# Patient Record
Sex: MALE | Employment: FULL TIME | URBAN - METROPOLITAN AREA
[De-identification: names, ages, dates, MRNs, and addresses within clinical notes are randomized per-mention and may not be internally consistent; named-entity substitution may affect disease eponyms.]

---

## 2018-07-23 PROBLEM — I34.1 MITRAL VALVE PROLAPSE: Status: ACTIVE | Noted: 2018-07-23

## 2018-07-23 PROBLEM — Z80.42 FAMILY HISTORY OF PROSTATE CANCER IN FATHER: Status: ACTIVE | Noted: 2018-07-23

## 2019-08-12 ENCOUNTER — OFFICE VISIT (OUTPATIENT)
Dept: ORTHOPEDICS | Facility: CLINIC | Age: 39
End: 2019-08-12
Payer: COMMERCIAL

## 2019-08-12 VITALS
WEIGHT: 200 LBS | HEIGHT: 71 IN | HEART RATE: 60 BPM | SYSTOLIC BLOOD PRESSURE: 122 MMHG | DIASTOLIC BLOOD PRESSURE: 70 MMHG | BODY MASS INDEX: 28 KG/M2

## 2019-08-12 DIAGNOSIS — S83.242A OTHER TEAR OF MEDIAL MENISCUS OF LEFT KNEE AS CURRENT INJURY, INITIAL ENCOUNTER: ICD-10-CM

## 2019-08-12 DIAGNOSIS — M19.171 POST-TRAUMATIC ARTHRITIS OF ANKLE, RIGHT: Primary | ICD-10-CM

## 2019-08-12 DIAGNOSIS — M25.562 ACUTE PAIN OF LEFT KNEE: ICD-10-CM

## 2019-08-12 PROCEDURE — 99204 PR OFFICE/OUTPT VISIT, NEW, LEVL IV, 45-59 MIN: ICD-10-PCS | Mod: S$GLB,,, | Performed by: ORTHOPAEDIC SURGERY

## 2019-08-12 PROCEDURE — 99204 OFFICE O/P NEW MOD 45 MIN: CPT | Mod: S$GLB,,, | Performed by: ORTHOPAEDIC SURGERY

## 2019-08-12 PROCEDURE — 3008F BODY MASS INDEX DOCD: CPT | Mod: S$GLB,,, | Performed by: ORTHOPAEDIC SURGERY

## 2019-08-12 PROCEDURE — 3008F PR BODY MASS INDEX (BMI) DOCUMENTED: ICD-10-PCS | Mod: S$GLB,,, | Performed by: ORTHOPAEDIC SURGERY

## 2019-08-12 NOTE — PROGRESS NOTES
[facility]    Subjective:     Chief Complaint:   Chief Complaint   Patient presents with    Right Ankle - Pain     Right ankle pain x a while. States that he had surgery a while back. His pain gets worse with activities.     Left Knee - Pain     Left knee pain x 1 month. States that his knee is not really pain. He was playing basketball about a month ago and states that he woke up the next day and his knee felt tight and swells. Does have popping in the knee.        Past Medical History:   Diagnosis Date    Asthma     Hyperlipidemia        Past Surgical History:   Procedure Laterality Date    ANKLE SURGERY         No current outpatient medications on file.     No current facility-administered medications for this visit.        Review of patient's allergies indicates:  No Known Allergies    Family History   Problem Relation Age of Onset    Hyperlipidemia Father        Social History     Socioeconomic History    Marital status: Single     Spouse name: Not on file    Number of children: Not on file    Years of education: Not on file    Highest education level: Not on file   Occupational History    Not on file   Social Needs    Financial resource strain: Not on file    Food insecurity:     Worry: Not on file     Inability: Not on file    Transportation needs:     Medical: Not on file     Non-medical: Not on file   Tobacco Use    Smoking status: Never Smoker   Substance and Sexual Activity    Alcohol use: Not on file    Drug use: Not on file    Sexual activity: Not on file   Lifestyle    Physical activity:     Days per week: Not on file     Minutes per session: Not on file    Stress: Not on file   Relationships    Social connections:     Talks on phone: Not on file     Gets together: Not on file     Attends Jain service: Not on file     Active member of club or organization: Not on file     Attends meetings of clubs or organizations: Not on file     Relationship status: Not on file    Other Topics Concern    Not on file   Social History Narrative    Not on file       History of present illness: 38-year-old male with 2 issues first is his right ankle. He scat right ankle issues for a long time he's had arthroscopy done he still tried to play basketball but it really bothers him when he plays ball hikes or anything more aggressive his pain swelling right ankle. No new injury. In his left knee is a recent problem over the last month or so he said some pain in the left knee he skis back at any activities but the knees still bothers him every day. Does not have a lot of swelling. Doesn't remember any specific injury. He is a very active individual.  Review of Systems:    Constitution: Negative for chills, fever, and sweats.  Negative for unexplained weight loss.    HENT:  Negative for headaches and blurry vision.    Cardiovascular:Negative for chest pain or irregular heart beat. Negative for hypertension.    Respiratory:  Negative for cough and shortness of breath.    Gastrointestinal: Negative for abdominal pain, heartburn, melena, nausea, and vomitting.    Genitourinary:  Negative bladder incontinence and dysuria.    Musculoskeletal:  See HPI for details.     Neurological: Negative for numbness.    Psychiatric/Behavioral: Negative for depression.  The patient is not nervous/anxious.      Endocrine: Negative for polyuria    Hematologic/Lymphatic: Negative for bleeding problem.  Does not bruise/bleed easily.    Skin: Negative for poor would healing and rash    Objective:      Physical Examination:    Vital Signs:    Vitals:    08/12/19 0843   BP: 122/70   Pulse: 60       Body mass index is 27.89 kg/m².    This a well-developed, well nourished patient in no acute distress.  They are alert and oriented and cooperative to examination.        Hanna look at the right ankle he scat normal alignment. Ankles slightly bigger than opposite side but does not have a major fusion. He has nearly normal  dorsiflexion plantarflexion has no pain with inversion eversion. He has slight tenderness to lateral side the ankle joint compared to the medial side. The left knee mild varus. No effusion. May have a little bit of quad atrophy. Little crepitation of the patella but also some tenderness along medial joint line no effusion good range of motion Vijaya not impressive and no limp  Pertinent New Results:    XRAY Report / Interpretation:   AP lateral mortise of the right ankle shows he clearly has arthritis of the ankle joint. There is sclerosis and narrowing more than 50% lateral side slightly worse than medial side. There is some subchondral cyst formation on the distal tibia. There is no hardware. There is no alignment issues. The lateral view show some spurring anterior posterior on the distal tibia. And we have the left knee AP lateral sunrise left knee. Shows mild varus alignment symmetrical both knees. No significant degenerative changes or acute findings in the left knee.Electronically Signed By Fortunato Hyman JR, MD    Assessment/Plan:      So my impression right ankles clearly traumatic arthritis. He's RA had a scope. Fortunately still has good range of motion. I don't think another scope can help him at this point his motion fortunately is still good. The issue is trying to limit some of his activities such as basketball which of course is going have a problem hiking yes that's gonorrhea problem he has ankle braces he has anti-inflammatories he has ice he has CBD oil and cream which seemed to help with flareups. Have any easy way around the right ankle. I do not think arthroscopy would be of any use. And he is not ready for any more aggressive treatment like effusion. The left knee were looking for medial meniscus tear. He's been quieted for about a month he still has pain some of the medial compartment I'm looking for meniscal tear left knee that would need fixing. I would do an MRI left knee      This note  was created using Dragon voice recognition software that occasionally misinterpreted phrases or words.

## 2019-08-17 ENCOUNTER — HOSPITAL ENCOUNTER (INPATIENT)
Facility: HOSPITAL | Age: 39
LOS: 3 days | Discharge: HOME | DRG: 389 | End: 2019-08-20
Attending: EMERGENCY MEDICINE | Admitting: INTERNAL MEDICINE
Payer: COMMERCIAL

## 2019-08-17 DIAGNOSIS — R10.9 ABDOMINAL PAIN: ICD-10-CM

## 2019-08-17 DIAGNOSIS — K56.609 SMALL BOWEL OBSTRUCTION: Primary | ICD-10-CM

## 2019-08-17 DIAGNOSIS — Z46.59 ENCOUNTER FOR NASOGASTRIC (NG) TUBE PLACEMENT: ICD-10-CM

## 2019-08-17 LAB
ALBUMIN SERPL BCP-MCNC: 4.4 G/DL (ref 3.5–5.2)
ALP SERPL-CCNC: 60 U/L (ref 55–135)
ALT SERPL W/O P-5'-P-CCNC: 68 U/L (ref 10–44)
AMPHET+METHAMPHET UR QL: NEGATIVE
ANION GAP SERPL CALC-SCNC: 13 MMOL/L (ref 8–16)
AST SERPL-CCNC: 60 U/L (ref 10–40)
BACTERIA #/AREA URNS HPF: NEGATIVE /HPF
BARBITURATES UR QL SCN>200 NG/ML: ABNORMAL
BASOPHILS # BLD AUTO: 0.02 K/UL (ref 0–0.2)
BASOPHILS NFR BLD: 0.5 % (ref 0–1.9)
BENZODIAZ UR QL SCN>200 NG/ML: NEGATIVE
BILIRUB SERPL-MCNC: 1.1 MG/DL (ref 0.1–1)
BILIRUB UR QL STRIP: ABNORMAL
BUN SERPL-MCNC: 21 MG/DL (ref 6–20)
BZE UR QL SCN: NEGATIVE
CALCIUM SERPL-MCNC: 9.1 MG/DL (ref 8.7–10.5)
CANNABINOIDS UR QL SCN: NEGATIVE
CHLORIDE SERPL-SCNC: 93 MMOL/L (ref 95–110)
CK SERPL-CCNC: 155 U/L (ref 20–200)
CLARITY UR: ABNORMAL
CO2 SERPL-SCNC: 28 MMOL/L (ref 23–29)
COLOR UR: YELLOW
CREAT SERPL-MCNC: 2.1 MG/DL (ref 0.5–1.4)
CREAT UR-MCNC: 840 MG/DL (ref 23–375)
DIFFERENTIAL METHOD: ABNORMAL
EOSINOPHIL # BLD AUTO: 0 K/UL (ref 0–0.5)
EOSINOPHIL NFR BLD: 0.5 % (ref 0–8)
ERYTHROCYTE [DISTWIDTH] IN BLOOD BY AUTOMATED COUNT: 12.3 % (ref 11.5–14.5)
EST. GFR  (AFRICAN AMERICAN): 44.2 ML/MIN/1.73 M^2
EST. GFR  (NON AFRICAN AMERICAN): 38.2 ML/MIN/1.73 M^2
GLUCOSE SERPL-MCNC: 135 MG/DL (ref 70–110)
GLUCOSE UR QL STRIP: NEGATIVE
HCT VFR BLD AUTO: 47.8 % (ref 40–54)
HGB BLD-MCNC: 15.8 G/DL (ref 14–18)
HGB UR QL STRIP: NEGATIVE
HYALINE CASTS #/AREA URNS LPF: 85 /LPF
IMM GRANULOCYTES # BLD AUTO: 0.02 K/UL (ref 0–0.04)
IMM GRANULOCYTES NFR BLD AUTO: 0.5 % (ref 0–0.5)
KETONES UR QL STRIP: ABNORMAL
LDH SERPL L TO P-CCNC: 1.02 MMOL/L (ref 0.5–2.2)
LEUKOCYTE ESTERASE UR QL STRIP: ABNORMAL
LIPASE SERPL-CCNC: 35 U/L (ref 4–60)
LYMPHOCYTES # BLD AUTO: 0.8 K/UL (ref 1–4.8)
LYMPHOCYTES NFR BLD: 17.5 % (ref 18–48)
MCH RBC QN AUTO: 27.5 PG (ref 27–31)
MCHC RBC AUTO-ENTMCNC: 33.1 G/DL (ref 32–36)
MCV RBC AUTO: 83 FL (ref 82–98)
MICROSCOPIC COMMENT: ABNORMAL
MONOCYTES # BLD AUTO: 0.7 K/UL (ref 0.3–1)
MONOCYTES NFR BLD: 16.3 % (ref 4–15)
NEUTROPHILS # BLD AUTO: 2.9 K/UL (ref 1.8–7.7)
NEUTROPHILS NFR BLD: 64.7 % (ref 38–73)
NITRITE UR QL STRIP: NEGATIVE
NRBC BLD-RTO: 0 /100 WBC
OPIATES UR QL SCN: NEGATIVE
PCP UR QL SCN>25 NG/ML: NEGATIVE
PH UR STRIP: 6 [PH] (ref 5–8)
PLATELET # BLD AUTO: 222 K/UL (ref 150–350)
PMV BLD AUTO: 10.6 FL (ref 9.2–12.9)
POTASSIUM SERPL-SCNC: 3.8 MMOL/L (ref 3.5–5.1)
PROT SERPL-MCNC: 7.7 G/DL (ref 6–8.4)
PROT UR QL STRIP: ABNORMAL
RBC # BLD AUTO: 5.75 M/UL (ref 4.6–6.2)
RBC #/AREA URNS HPF: 11 /HPF (ref 0–4)
SAMPLE: NORMAL
SODIUM SERPL-SCNC: 134 MMOL/L (ref 136–145)
SP GR UR STRIP: 1.03 (ref 1–1.03)
SQUAMOUS #/AREA URNS HPF: 9 /HPF
TOXICOLOGY INFORMATION: ABNORMAL
URN SPEC COLLECT METH UR: ABNORMAL
UROBILINOGEN UR STRIP-ACNC: ABNORMAL EU/DL
WBC # BLD AUTO: 4.41 K/UL (ref 3.9–12.7)
WBC #/AREA URNS HPF: 10 /HPF (ref 0–5)

## 2019-08-17 PROCEDURE — 80307 DRUG TEST PRSMV CHEM ANLYZR: CPT

## 2019-08-17 PROCEDURE — 96375 TX/PRO/DX INJ NEW DRUG ADDON: CPT

## 2019-08-17 PROCEDURE — 93005 ELECTROCARDIOGRAM TRACING: CPT

## 2019-08-17 PROCEDURE — 12000002 HC ACUTE/MED SURGE SEMI-PRIVATE ROOM

## 2019-08-17 PROCEDURE — 83690 ASSAY OF LIPASE: CPT

## 2019-08-17 PROCEDURE — 36415 COLL VENOUS BLD VENIPUNCTURE: CPT

## 2019-08-17 PROCEDURE — 99285 EMERGENCY DEPT VISIT HI MDM: CPT | Mod: 25

## 2019-08-17 PROCEDURE — 43752 NASAL/OROGASTRIC W/TUBE PLMT: CPT

## 2019-08-17 PROCEDURE — 63600175 PHARM REV CODE 636 W HCPCS: Performed by: EMERGENCY MEDICINE

## 2019-08-17 PROCEDURE — 96367 TX/PROPH/DG ADDL SEQ IV INF: CPT

## 2019-08-17 PROCEDURE — 80053 COMPREHEN METABOLIC PANEL: CPT

## 2019-08-17 PROCEDURE — 96365 THER/PROPH/DIAG IV INF INIT: CPT

## 2019-08-17 PROCEDURE — 81001 URINALYSIS AUTO W/SCOPE: CPT

## 2019-08-17 PROCEDURE — 83605 ASSAY OF LACTIC ACID: CPT

## 2019-08-17 PROCEDURE — 82550 ASSAY OF CK (CPK): CPT

## 2019-08-17 PROCEDURE — 85025 COMPLETE CBC W/AUTO DIFF WBC: CPT

## 2019-08-17 PROCEDURE — 96361 HYDRATE IV INFUSION ADD-ON: CPT

## 2019-08-17 RX ORDER — ONDANSETRON 2 MG/ML
4 INJECTION INTRAMUSCULAR; INTRAVENOUS
Status: COMPLETED | OUTPATIENT
Start: 2019-08-17 | End: 2019-08-17

## 2019-08-17 RX ORDER — ACETAMINOPHEN 10 MG/ML
1000 INJECTION, SOLUTION INTRAVENOUS EVERY 8 HOURS
Status: COMPLETED | OUTPATIENT
Start: 2019-08-17 | End: 2019-08-18

## 2019-08-17 RX ADMIN — ONDANSETRON 4 MG: 2 INJECTION INTRAMUSCULAR; INTRAVENOUS at 09:08

## 2019-08-17 RX ADMIN — SODIUM CHLORIDE, SODIUM LACTATE, POTASSIUM CHLORIDE, AND CALCIUM CHLORIDE 1000 ML: .6; .31; .03; .02 INJECTION, SOLUTION INTRAVENOUS at 09:08

## 2019-08-17 RX ADMIN — PIPERACILLIN SODIUM AND TAZOBACTAM SODIUM 3.38 G: 3; .375 INJECTION, POWDER, LYOPHILIZED, FOR SOLUTION INTRAVENOUS at 11:08

## 2019-08-17 RX ADMIN — SODIUM CHLORIDE, SODIUM LACTATE, POTASSIUM CHLORIDE, AND CALCIUM CHLORIDE 1000 ML: .6; .31; .03; .02 INJECTION, SOLUTION INTRAVENOUS at 11:08

## 2019-08-17 RX ADMIN — ACETAMINOPHEN 1000 MG: 10 INJECTION, SOLUTION INTRAVENOUS at 09:08

## 2019-08-18 PROBLEM — R74.01 TRANSAMINITIS: Status: ACTIVE | Noted: 2019-08-18

## 2019-08-18 PROBLEM — R10.9 ABDOMINAL PAIN: Status: ACTIVE | Noted: 2019-08-18

## 2019-08-18 PROBLEM — K56.609 SMALL BOWEL OBSTRUCTION: Status: ACTIVE | Noted: 2019-08-18

## 2019-08-18 PROBLEM — N17.9 AKI (ACUTE KIDNEY INJURY): Status: ACTIVE | Noted: 2019-08-18

## 2019-08-18 LAB
BASOPHILS # BLD AUTO: 0.02 K/UL (ref 0–0.2)
BASOPHILS NFR BLD: 0.6 % (ref 0–1.9)
DIFFERENTIAL METHOD: ABNORMAL
EOSINOPHIL # BLD AUTO: 0 K/UL (ref 0–0.5)
EOSINOPHIL NFR BLD: 0.3 % (ref 0–8)
ERYTHROCYTE [DISTWIDTH] IN BLOOD BY AUTOMATED COUNT: 12.4 % (ref 11.5–14.5)
HCT VFR BLD AUTO: 45.6 % (ref 40–54)
HGB BLD-MCNC: 15 G/DL (ref 14–18)
IMM GRANULOCYTES # BLD AUTO: 0.01 K/UL (ref 0–0.04)
IMM GRANULOCYTES NFR BLD AUTO: 0.3 % (ref 0–0.5)
LYMPHOCYTES # BLD AUTO: 0.7 K/UL (ref 1–4.8)
LYMPHOCYTES NFR BLD: 20.4 % (ref 18–48)
MCH RBC QN AUTO: 27.5 PG (ref 27–31)
MCHC RBC AUTO-ENTMCNC: 32.9 G/DL (ref 32–36)
MCV RBC AUTO: 84 FL (ref 82–98)
MONOCYTES # BLD AUTO: 0.7 K/UL (ref 0.3–1)
MONOCYTES NFR BLD: 21.6 % (ref 4–15)
NEUTROPHILS # BLD AUTO: 2 K/UL (ref 1.8–7.7)
NEUTROPHILS NFR BLD: 56.8 % (ref 38–73)
NRBC BLD-RTO: 0 /100 WBC
PLATELET # BLD AUTO: 212 K/UL (ref 150–350)
PMV BLD AUTO: 10.5 FL (ref 9.2–12.9)
RBC # BLD AUTO: 5.46 M/UL (ref 4.6–6.2)
WBC # BLD AUTO: 3.43 K/UL (ref 3.9–12.7)

## 2019-08-18 PROCEDURE — 25000003 PHARM REV CODE 250: Performed by: EMERGENCY MEDICINE

## 2019-08-18 PROCEDURE — 63600175 PHARM REV CODE 636 W HCPCS: Performed by: EMERGENCY MEDICINE

## 2019-08-18 PROCEDURE — 25000003 PHARM REV CODE 250: Performed by: INTERNAL MEDICINE

## 2019-08-18 PROCEDURE — 94761 N-INVAS EAR/PLS OXIMETRY MLT: CPT

## 2019-08-18 PROCEDURE — 96375 TX/PRO/DX INJ NEW DRUG ADDON: CPT

## 2019-08-18 PROCEDURE — 12000002 HC ACUTE/MED SURGE SEMI-PRIVATE ROOM

## 2019-08-18 PROCEDURE — 85025 COMPLETE CBC W/AUTO DIFF WBC: CPT

## 2019-08-18 PROCEDURE — 36415 COLL VENOUS BLD VENIPUNCTURE: CPT

## 2019-08-18 PROCEDURE — 63600175 PHARM REV CODE 636 W HCPCS: Performed by: NURSE PRACTITIONER

## 2019-08-18 PROCEDURE — 96376 TX/PRO/DX INJ SAME DRUG ADON: CPT

## 2019-08-18 RX ORDER — HYDROMORPHONE HYDROCHLORIDE 1 MG/ML
0.5 INJECTION, SOLUTION INTRAMUSCULAR; INTRAVENOUS; SUBCUTANEOUS
Status: COMPLETED | OUTPATIENT
Start: 2019-08-18 | End: 2019-08-18

## 2019-08-18 RX ORDER — LANOLIN ALCOHOL/MO/W.PET/CERES
800 CREAM (GRAM) TOPICAL
Status: DISCONTINUED | OUTPATIENT
Start: 2019-08-18 | End: 2019-08-18

## 2019-08-18 RX ORDER — MORPHINE SULFATE 2 MG/ML
2 INJECTION, SOLUTION INTRAMUSCULAR; INTRAVENOUS EVERY 4 HOURS PRN
Status: DISCONTINUED | OUTPATIENT
Start: 2019-08-18 | End: 2019-08-20 | Stop reason: HOSPADM

## 2019-08-18 RX ORDER — ONDANSETRON 2 MG/ML
4 INJECTION INTRAMUSCULAR; INTRAVENOUS
Status: COMPLETED | OUTPATIENT
Start: 2019-08-18 | End: 2019-08-18

## 2019-08-18 RX ORDER — SODIUM CHLORIDE 0.9 % (FLUSH) 0.9 %
3 SYRINGE (ML) INJECTION
Status: DISCONTINUED | OUTPATIENT
Start: 2019-08-18 | End: 2019-08-20 | Stop reason: HOSPADM

## 2019-08-18 RX ORDER — MORPHINE SULFATE 4 MG/ML
4 INJECTION, SOLUTION INTRAMUSCULAR; INTRAVENOUS EVERY 4 HOURS PRN
Status: DISCONTINUED | OUTPATIENT
Start: 2019-08-18 | End: 2019-08-20 | Stop reason: HOSPADM

## 2019-08-18 RX ORDER — SODIUM CHLORIDE 9 MG/ML
INJECTION, SOLUTION INTRAVENOUS CONTINUOUS
Status: DISCONTINUED | OUTPATIENT
Start: 2019-08-18 | End: 2019-08-20 | Stop reason: HOSPADM

## 2019-08-18 RX ORDER — POTASSIUM CHLORIDE 20 MEQ/15ML
40 SOLUTION ORAL
Status: DISCONTINUED | OUTPATIENT
Start: 2019-08-18 | End: 2019-08-18

## 2019-08-18 RX ORDER — ONDANSETRON 2 MG/ML
4 INJECTION INTRAMUSCULAR; INTRAVENOUS EVERY 8 HOURS PRN
Status: DISCONTINUED | OUTPATIENT
Start: 2019-08-18 | End: 2019-08-20 | Stop reason: HOSPADM

## 2019-08-18 RX ORDER — LIDOCAINE HYDROCHLORIDE 20 MG/ML
10 SOLUTION OROPHARYNGEAL
Status: DISCONTINUED | OUTPATIENT
Start: 2019-08-18 | End: 2019-08-20 | Stop reason: HOSPADM

## 2019-08-18 RX ADMIN — ACETAMINOPHEN 1000 MG: 10 INJECTION, SOLUTION INTRAVENOUS at 06:08

## 2019-08-18 RX ADMIN — LIDOCAINE HYDROCHLORIDE 10 ML: 20 SOLUTION ORAL; TOPICAL at 02:08

## 2019-08-18 RX ADMIN — LIDOCAINE HYDROCHLORIDE 10 ML: 20 SOLUTION ORAL; TOPICAL at 06:08

## 2019-08-18 RX ADMIN — ONDANSETRON 4 MG: 2 INJECTION INTRAMUSCULAR; INTRAVENOUS at 10:08

## 2019-08-18 RX ADMIN — PROMETHAZINE HYDROCHLORIDE 6.25 MG: 25 INJECTION INTRAMUSCULAR; INTRAVENOUS at 01:08

## 2019-08-18 RX ADMIN — SODIUM CHLORIDE: 0.9 INJECTION, SOLUTION INTRAVENOUS at 02:08

## 2019-08-18 RX ADMIN — MORPHINE SULFATE 2 MG: 2 INJECTION, SOLUTION INTRAMUSCULAR; INTRAVENOUS at 09:08

## 2019-08-18 RX ADMIN — BENZOCAINE, BUTAMBEN, AND TETRACAINE HYDROCHLORIDE 1 SPRAY: .028; .004; .004 AEROSOL, SPRAY TOPICAL at 02:08

## 2019-08-18 RX ADMIN — Medication: at 10:08

## 2019-08-18 RX ADMIN — ONDANSETRON 4 MG: 2 INJECTION INTRAMUSCULAR; INTRAVENOUS at 01:08

## 2019-08-18 RX ADMIN — PROMETHAZINE HYDROCHLORIDE 6.25 MG: 25 INJECTION INTRAMUSCULAR; INTRAVENOUS at 07:08

## 2019-08-18 RX ADMIN — HYDROMORPHONE HYDROCHLORIDE 0.5 MG: 1 INJECTION, SOLUTION INTRAMUSCULAR; INTRAVENOUS; SUBCUTANEOUS at 01:08

## 2019-08-18 RX ADMIN — ACETAMINOPHEN 1000 MG: 10 INJECTION, SOLUTION INTRAVENOUS at 02:08

## 2019-08-18 NOTE — PROGRESS NOTES
HOSPITALIST FOLLOW UP NOTE    46-year-old gentleman with no significant past medical history was admitted with small bowel obstruction of unknown etiology, dehydration, acute kidney injury, hyponatremia, and transaminitis.  Apparently the patient reports irritation from the NG tube causing some vomiting this morning.  He denies nausea.  Currently denies abdominal pain.  He is passing flatus.  He had some liquid stool.  He denies fever or chills.  He appears comfortable on exam.  Minimal abdominal tenderness with soft abdomen.  Will continue course including NG tube decompression.  Will use viscous lidocaine for irritation from the NG tube. Case discussed with Surgery Dr Alvarenga. AM KUB and labs. NPO with IVFS.  Supportive care good pain control and antiemetics as needed.  Avoid nephrotoxins.  Check bladder scan.  SCDs for DVT prophylaxis.    [facility] [place] [facility]

## 2019-08-18 NOTE — ASSESSMENT & PLAN NOTE
Unclear as to cause of the small-bowel obstructions since patient does not have any hernias or previous surgery. However it looks like a spontaneously improving.  Will continue bowel rest and intravenous hydration for another 24 hr and repeat the x-rays.

## 2019-08-18 NOTE — HPI
Patient admitted to the hospitalist service last night with diagnosis small-bowel obstruction. Patient complained of nausea and vomiting for last several days.  Crampy abdominal pain. Patient started feeling better after he had a huge vomitus in the emergency room as well as got an NG tube placed. About an hour prior to this interview patient had a huge liquid bowel movement and passed a lot of gas. He is actually feeling pretty well right now except he is bothered by his nasogastric tube.

## 2019-08-18 NOTE — PLAN OF CARE
Problem: Adult Inpatient Plan of Care  Goal: Plan of Care Review  Outcome: Ongoing (interventions implemented as appropriate)     08/18/19 1696   Plan of Care Review   Plan of Care Reviewed With patient;parent

## 2019-08-18 NOTE — SUBJECTIVE & OBJECTIVE
No current facility-administered medications on file prior to encounter.      No current outpatient medications on file prior to encounter.       Review of patient's allergies indicates:  No Known Allergies    Past Medical History:   Diagnosis Date    Asthma     Hyperlipidemia      Past Surgical History:   Procedure Laterality Date    ANKLE SURGERY       Family History     Problem Relation (Age of Onset)    Hyperlipidemia Father        Tobacco Use    Smoking status: Never Smoker   Substance and Sexual Activity    Alcohol use: Not Currently    Drug use: Never    Sexual activity: Not on file     Review of Systems   Constitutional: Negative for appetite change, chills, fever and unexpected weight change.   HENT: Negative for hearing loss, rhinorrhea, sore throat and voice change.    Eyes: Negative for photophobia and visual disturbance.   Respiratory: Negative for cough, choking and shortness of breath.    Cardiovascular: Negative for chest pain, palpitations and leg swelling.   Gastrointestinal: Negative for abdominal pain, blood in stool, constipation, diarrhea, nausea and vomiting.   Endocrine: Negative for cold intolerance, heat intolerance, polydipsia and polyuria.   Musculoskeletal: Negative for arthralgias, back pain, joint swelling and neck stiffness.   Skin: Negative for color change, pallor and rash.   Neurological: Negative for dizziness, seizures, syncope and headaches.   Hematological: Negative for adenopathy. Does not bruise/bleed easily.   Psychiatric/Behavioral: Negative for agitation, behavioral problems and confusion.     Objective:     Vital Signs (Most Recent):  Temp: 98.8 °F (37.1 °C) (08/18/19 0927)  Pulse: 83 (08/18/19 0927)  Resp: 18 (08/18/19 0927)  BP: 125/85 (08/18/19 0927)  SpO2: 97 % (08/18/19 0927) Vital Signs (24h Range):  Temp:  [98.7 °F (37.1 °C)-99.6 °F (37.6 °C)] 98.8 °F (37.1 °C)  Pulse:  [] 83  Resp:  [16-20] 18  SpO2:  [95 %-100 %] 97 %  BP: (104-144)/(76-86) 125/85      Weight: 90.7 kg (200 lb)  Body mass index is 27.89 kg/m².    Physical Exam    Significant Labs:  CBC:   Recent Labs   Lab 08/18/19  0505   WBC 3.43*   RBC 5.46   HGB 15.0   HCT 45.6      MCV 84   MCH 27.5   MCHC 32.9     BMP:   Recent Labs   Lab 08/17/19 2043   *   *   K 3.8   CL 93*   CO2 28   BUN 21*   CREATININE 2.1*   CALCIUM 9.1     CMP:   Recent Labs   Lab 08/17/19 2043   *   CALCIUM 9.1   ALBUMIN 4.4   PROT 7.7   *   K 3.8   CO2 28   CL 93*   BUN 21*   CREATININE 2.1*   ALKPHOS 60   ALT 68*   AST 60*   BILITOT 1.1*       Significant Diagnostics:  I have reviewed all pertinent imaging results/findings within the past 24 hours.

## 2019-08-18 NOTE — HPI
The patient is a 38 year old physically active male with no significant medical history. He had no abdominal surgery in the past. He presented to the ED with progressive moderate to severe diffuse abdominal pain, associated with abdominal distention, nausea, and vomiting for 2 days. He states that he has not had BMs since yesterday morning. There are no aggravating/alleviating factors. CT abdomen and pelvis showed dilatation of the small bowel loops with possible narrowed zone of transition in the right lower quadrant, likely representing small bowel obstruction secondary to adhesion or stricture.

## 2019-08-18 NOTE — CARE UPDATE
08/18/19 0803   Patient Assessment/Suction   Level of Consciousness (AVPU) alert   Respiratory Effort Normal;Unlabored   PRE-TX-O2   O2 Device (Oxygen Therapy) room air   SpO2 97 %   $ Pulse Oximetry - Multiple Charge Pulse Oximetry - Multiple   Pulse 79   Resp 18

## 2019-08-18 NOTE — SUBJECTIVE & OBJECTIVE
Past Medical History:   Diagnosis Date    Asthma     Hyperlipidemia        Past Surgical History:   Procedure Laterality Date    ANKLE SURGERY         Review of patient's allergies indicates:  No Known Allergies    No current facility-administered medications on file prior to encounter.      No current outpatient medications on file prior to encounter.     Family History     Problem Relation (Age of Onset)    Hyperlipidemia Father        Tobacco Use    Smoking status: Never Smoker   Substance and Sexual Activity    Alcohol use: Not on file    Drug use: Not on file    Sexual activity: Not on file     Review of Systems   Constitutional: Negative for fever and unexpected weight change.   HENT: Negative.    Eyes: Negative.    Respiratory: Negative for shortness of breath.    Cardiovascular: Positive for chest pain. Negative for leg swelling.   Gastrointestinal: Positive for abdominal distention, abdominal pain, nausea and vomiting.   Endocrine: Negative.    Genitourinary: Negative.    Musculoskeletal: Negative.    Skin: Negative.    Allergic/Immunologic: Negative.    Psychiatric/Behavioral: Negative.      Objective:     Vital Signs (Most Recent):  Temp: 98.7 °F (37.1 °C) (08/18/19 0211)  Pulse: 86 (08/18/19 0211)  Resp: 16 (08/18/19 0211)  BP: 125/83 (08/18/19 0211)  SpO2: 95 % (08/18/19 0211) Vital Signs (24h Range):  Temp:  [98.7 °F (37.1 °C)-99.3 °F (37.4 °C)] 98.7 °F (37.1 °C)  Pulse:  [] 86  Resp:  [16-20] 16  SpO2:  [95 %-100 %] 95 %  BP: (104-144)/(76-86) 125/83     Weight: 90.7 kg (200 lb)  Body mass index is 27.89 kg/m².    Physical Exam   Constitutional: He is oriented to person, place, and time. He appears well-developed and well-nourished.   Appears very uncomfortable   Eyes: Conjunctivae and EOM are normal.   Neck: Normal range of motion. Neck supple.   Cardiovascular: Regular rhythm.   No murmur heard.  Tachycardic   Pulmonary/Chest: Breath sounds normal. He has no wheezes. He has no rales.    Abdominal: He exhibits distension. There is no guarding.   Genitourinary: Penis normal.   Musculoskeletal: Normal range of motion.   Neurological: He is alert and oriented to person, place, and time.   Skin: Skin is warm and dry. He is not diaphoretic.   Psychiatric:   anxious   Vitals reviewed.        CRANIAL NERVES     CN III, IV, VI   Extraocular motions are normal.        Significant Labs:   CBC:   Recent Labs   Lab 08/17/19 2043   WBC 4.41   HGB 15.8   HCT 47.8        CMP:   Recent Labs   Lab 08/17/19 2043   *   K 3.8   CL 93*   CO2 28   *   BUN 21*   CREATININE 2.1*   CALCIUM 9.1   PROT 7.7   ALBUMIN 4.4   BILITOT 1.1*   ALKPHOS 60   AST 60*   ALT 68*   ANIONGAP 13   EGFRNONAA 38.2*     Lactic Acid: No results for input(s): LACTATE in the last 48 hours.  All pertinent labs within the past 24 hours have been reviewed.    Significant Imaging:   X-ray Knee 3 View Left    Result Date: 8/12/2019  See office visit note for XRAY Report/Interpretation dictation. This procedure was auto-finalized.    X-ray Ankle Complete Right    Result Date: 8/12/2019  See office visit note for XRAY Report/Interpretation dictation. This procedure was auto-finalized.    Ct Renal Stone Study Abd Pelvis Wo    Result Date: 8/17/2019  Exam: CT OF THE ABDOMEN/PELVIS WITHOUT CONTRAST Clinical data: Abdominal pain, distention. Technique: Direct contiguous axial CT images were acquired through the abdomen and pelvis without contrast using soft tissue and bone algorithms. Reformatted/MPR images were performed. Radiation dose: CTDIvol = 11.0 mGy, DLP = 632.80 mGy x cm. Limitations: Lack of intravenous contrast limits evaluation of solid viscera. Lack of oral contrast limits evaluation of the bowel loops. Prior Studies: No prior studies submitted. Findings: Lung bases:  Grossly clear. Liver:   Unremarkable size, contour, and density. No evidence of mass. No evidence of dilated ducts. Gallbladder :  Unremarkable Spleen:   Grossly unremarkable. Pancreas/adrenal glands:   Grossly unremarkable size, contour and density. Kidneys:   In anatomic position. Grossly unremarkable renal size, contour and density. No renal or ureteral calculi. No hydronephrosis.  Perinephric space is unremarkable. Retroperitoneum: No retroperitoneal lymphadenopathy. Unremarkable abdominal aorta.  The IVC is grossly unremarkable. Peritoneal cavity:  No evidence of free air or ascites. Gastrointestinal tract: Dilatation of the small bowel loops is identified with possible narrowed zone of transition in the right lower quadrant.  Likely representing small bowel obstruction secondary to adhesion or stricture. Appendix:  Unremarkable. Pelvis:  Solid and hollow viscera grossly unremarkable.  Bladder is moderately distended. Osseous structures:  No acute or destructive bony process identified.  Degenerative disc disease at the L4-5 and L5-S1 levels. IMPRESSION: Dilatation of the small bowel loops is identified with possible narrowed zone of transition in the right lower quadrant.  Likely representing small bowel obstruction secondary to adhesion or stricture. Recommendation: Follow up as clinically indicated. All CT scans at this facility utilize dose modulation, iterative reconstruction, and/or weight based dosing when appropriate to reduce radiation dose to as low as reasonably achievable. Read by:        Gianni Herrera MD Transcribed by: Kelvin Park Transcribed Date: 8/18/2019 0:10:24 AM Electronically signed by: Gianni Herrera MD Date signed: 8/18/2019 0:16:28 AM

## 2019-08-18 NOTE — CONSULTS
[facility]  General Surgery  Consult Note    Patient Name: Andrade Moreno  MRN: [id number]  Code Status: Full Code  Admission Date: 8/17/2019  Hospital Length of Stay: 0 days  Attending Physician: Harshal Carter MD  Primary Care Provider: Camille Grider MD    Patient information was obtained from patient and ER records.     Consults  Subjective:     Principal Problem: Small bowel obstruction    History of Present Illness: Patient admitted to the hospitalist service last night with diagnosis small-bowel obstruction. Patient complained of nausea and vomiting for last several days.  Crampy abdominal pain. Patient started feeling better after he had a huge vomitus in the emergency room as well as got an NG tube placed. About an hour prior to this interview patient had a huge liquid bowel movement and passed a lot of gas. He is actually feeling pretty well right now except he is bothered by his nasogastric tube.    No current facility-administered medications on file prior to encounter.      No current outpatient medications on file prior to encounter.       Review of patient's allergies indicates:  No Known Allergies    Past Medical History:   Diagnosis Date    Asthma     Hyperlipidemia      Past Surgical History:   Procedure Laterality Date    ANKLE SURGERY       Family History     Problem Relation (Age of Onset)    Hyperlipidemia Father        Tobacco Use    Smoking status: Never Smoker   Substance and Sexual Activity    Alcohol use: Not Currently    Drug use: Never    Sexual activity: Not on file     Review of Systems   Constitutional: Negative for appetite change, chills, fever and unexpected weight change.   HENT: Negative for hearing loss, rhinorrhea, sore throat and voice change.    Eyes: Negative for photophobia and visual disturbance.   Respiratory: Negative for cough, choking and shortness of breath.    Cardiovascular: Negative for chest pain, palpitations and leg swelling.    Gastrointestinal: Negative for abdominal pain, blood in stool, constipation, diarrhea, nausea and vomiting.   Endocrine: Negative for cold intolerance, heat intolerance, polydipsia and polyuria.   Musculoskeletal: Negative for arthralgias, back pain, joint swelling and neck stiffness.   Skin: Negative for color change, pallor and rash.   Neurological: Negative for dizziness, seizures, syncope and headaches.   Hematological: Negative for adenopathy. Does not bruise/bleed easily.   Psychiatric/Behavioral: Negative for agitation, behavioral problems and confusion.     Objective:     Vital Signs (Most Recent):  Temp: 98.8 °F (37.1 °C) (08/18/19 0927)  Pulse: 83 (08/18/19 0927)  Resp: 18 (08/18/19 0927)  BP: 125/85 (08/18/19 0927)  SpO2: 97 % (08/18/19 0927) Vital Signs (24h Range):  Temp:  [98.7 °F (37.1 °C)-99.6 °F (37.6 °C)] 98.8 °F (37.1 °C)  Pulse:  [] 83  Resp:  [16-20] 18  SpO2:  [95 %-100 %] 97 %  BP: (104-144)/(76-86) 125/85     Weight: 90.7 kg (200 lb)  Body mass index is 27.89 kg/m².    Physical Exam    Significant Labs:  CBC:   Recent Labs   Lab 08/18/19  0505   WBC 3.43*   RBC 5.46   HGB 15.0   HCT 45.6      MCV 84   MCH 27.5   MCHC 32.9     BMP:   Recent Labs   Lab 08/17/19 2043   *   *   K 3.8   CL 93*   CO2 28   BUN 21*   CREATININE 2.1*   CALCIUM 9.1     CMP:   Recent Labs   Lab 08/17/19  2043   *   CALCIUM 9.1   ALBUMIN 4.4   PROT 7.7   *   K 3.8   CO2 28   CL 93*   BUN 21*   CREATININE 2.1*   ALKPHOS 60   ALT 68*   AST 60*   BILITOT 1.1*       Significant Diagnostics:  I have reviewed all pertinent imaging results/findings within the past 24 hours.    Assessment/Plan:     * Small bowel obstruction  Unclear as to cause of the small-bowel obstructions since patient does not have any hernias or previous surgery. However it looks like a spontaneously improving.  Will continue bowel rest and intravenous hydration for another 24 hr and repeat the x-rays.      VTE  Risk Mitigation (From admission, onward)        Ordered     IP VTE LOW RISK PATIENT  Once      08/18/19 0210     Place sequential compression device  Until discontinued      08/18/19 0210          Thank you for your consult. I will follow-up with patient. Please contact us if you have any additional questions.    Richar Tompkins MD  General Surgery  [facility]

## 2019-08-18 NOTE — H&P
[facility] Medicine  History & Physical    Patient Name: Andrade Moreno  MRN: [id number]  Admission Date: 8/17/2019  Attending Physician: Ramez Coe MD  Primary Care Provider: Camille Grider MD         Patient information was obtained from patient and ER records.     Subjective:     Principal Problem:Abdominal pain    Chief Complaint:   Chief Complaint   Patient presents with    Abdominal Pain        HPI: The patient is a 38 year old physically active male with no significant medical history. He had no abdominal surgery in the past. He presented to the ED with progressive moderate to severe diffuse abdominal pain, associated with abdominal distention, nausea, and vomiting for 2 days. He states that he has not had BMs since yesterday morning. There are no aggravating/alleviating factors. CT abdomen and pelvis showed dilatation of the small bowel loops with possible narrowed zone of transition in the right lower quadrant, likely representing small bowel obstruction secondary to adhesion or stricture.    Past Medical History:   Diagnosis Date    Asthma     Hyperlipidemia        Past Surgical History:   Procedure Laterality Date    ANKLE SURGERY         Review of patient's allergies indicates:  No Known Allergies    No current facility-administered medications on file prior to encounter.      No current outpatient medications on file prior to encounter.     Family History     Problem Relation (Age of Onset)    Hyperlipidemia Father        Tobacco Use    Smoking status: Never Smoker   Substance and Sexual Activity    Alcohol use: Not on file    Drug use: Not on file    Sexual activity: Not on file     Review of Systems   Constitutional: Negative for fever and unexpected weight change.   HENT: Negative.    Eyes: Negative.    Respiratory: Negative for shortness of breath.    Cardiovascular: Positive for chest pain. Negative for leg swelling.   Gastrointestinal: Positive for abdominal  distention, abdominal pain, nausea and vomiting.   Endocrine: Negative.    Genitourinary: Negative.    Musculoskeletal: Negative.    Skin: Negative.    Allergic/Immunologic: Negative.    Psychiatric/Behavioral: Negative.      Objective:     Vital Signs (Most Recent):  Temp: 98.7 °F (37.1 °C) (08/18/19 0211)  Pulse: 86 (08/18/19 0211)  Resp: 16 (08/18/19 0211)  BP: 125/83 (08/18/19 0211)  SpO2: 95 % (08/18/19 0211) Vital Signs (24h Range):  Temp:  [98.7 °F (37.1 °C)-99.3 °F (37.4 °C)] 98.7 °F (37.1 °C)  Pulse:  [] 86  Resp:  [16-20] 16  SpO2:  [95 %-100 %] 95 %  BP: (104-144)/(76-86) 125/83     Weight: 90.7 kg (200 lb)  Body mass index is 27.89 kg/m².    Physical Exam   Constitutional: He is oriented to person, place, and time. He appears well-developed and well-nourished.   Appears very uncomfortable   Eyes: Conjunctivae and EOM are normal.   Neck: Normal range of motion. Neck supple.   Cardiovascular: Regular rhythm.   No murmur heard.  Tachycardic   Pulmonary/Chest: Breath sounds normal. He has no wheezes. He has no rales.   Abdominal: He exhibits distension. There is no guarding.   Genitourinary: Penis normal.   Musculoskeletal: Normal range of motion.   Neurological: He is alert and oriented to person, place, and time.   Skin: Skin is warm and dry. He is not diaphoretic.   Psychiatric:   anxious   Vitals reviewed.        CRANIAL NERVES     CN III, IV, VI   Extraocular motions are normal.        Significant Labs:   CBC:   Recent Labs   Lab 08/17/19 2043   WBC 4.41   HGB 15.8   HCT 47.8        CMP:   Recent Labs   Lab 08/17/19 2043   *   K 3.8   CL 93*   CO2 28   *   BUN 21*   CREATININE 2.1*   CALCIUM 9.1   PROT 7.7   ALBUMIN 4.4   BILITOT 1.1*   ALKPHOS 60   AST 60*   ALT 68*   ANIONGAP 13   EGFRNONAA 38.2*     Lactic Acid: No results for input(s): LACTATE in the last 48 hours.  All pertinent labs within the past 24 hours have been reviewed.    Significant Imaging:   X-ray Knee 3 View  Left    Result Date: 8/12/2019  See office visit note for XRAY Report/Interpretation dictation. This procedure was auto-finalized.    X-ray Ankle Complete Right    Result Date: 8/12/2019  See office visit note for XRAY Report/Interpretation dictation. This procedure was auto-finalized.    Ct Renal Stone Study Abd Pelvis Wo    Result Date: 8/17/2019  Exam: CT OF THE ABDOMEN/PELVIS WITHOUT CONTRAST Clinical data: Abdominal pain, distention. Technique: Direct contiguous axial CT images were acquired through the abdomen and pelvis without contrast using soft tissue and bone algorithms. Reformatted/MPR images were performed. Radiation dose: CTDIvol = 11.0 mGy, DLP = 632.80 mGy x cm. Limitations: Lack of intravenous contrast limits evaluation of solid viscera. Lack of oral contrast limits evaluation of the bowel loops. Prior Studies: No prior studies submitted. Findings: Lung bases:  Grossly clear. Liver:   Unremarkable size, contour, and density. No evidence of mass. No evidence of dilated ducts. Gallbladder :  Unremarkable Spleen:  Grossly unremarkable. Pancreas/adrenal glands:   Grossly unremarkable size, contour and density. Kidneys:   In anatomic position. Grossly unremarkable renal size, contour and density. No renal or ureteral calculi. No hydronephrosis.  Perinephric space is unremarkable. Retroperitoneum: No retroperitoneal lymphadenopathy. Unremarkable abdominal aorta.  The IVC is grossly unremarkable. Peritoneal cavity:  No evidence of free air or ascites. Gastrointestinal tract: Dilatation of the small bowel loops is identified with possible narrowed zone of transition in the right lower quadrant.  Likely representing small bowel obstruction secondary to adhesion or stricture. Appendix:  Unremarkable. Pelvis:  Solid and hollow viscera grossly unremarkable.  Bladder is moderately distended. Osseous structures:  No acute or destructive bony process identified.  Degenerative disc disease at the L4-5 and L5-S1  levels. IMPRESSION: Dilatation of the small bowel loops is identified with possible narrowed zone of transition in the right lower quadrant.  Likely representing small bowel obstruction secondary to adhesion or stricture. Recommendation: Follow up as clinically indicated. All CT scans at this facility utilize dose modulation, iterative reconstruction, and/or weight based dosing when appropriate to reduce radiation dose to as low as reasonably achievable. Read by:        Gianni Herrera MD Transcribed by: Kelvin Park Transcribed Date: 8/18/2019 0:10:24 AM Electronically signed by: Gianni Herrera MD Date signed: 8/18/2019 0:16:28 AM       Assessment/Plan:     * Abdominal pain  Due to SBO  NPO  NG to LIS  Consult surgery  IV hydration      DAREK (acute kidney injury)  IV hydration  Monitor      Transaminitis  Patient reports taking OTC supplement  Monitor          VTE Risk Mitigation (From admission, onward)        Ordered     IP VTE LOW RISK PATIENT  Once      08/18/19 0210     Place sequential compression device  Until discontinued      08/18/19 0210             ROMAIN Mcqueen  Department of Hospital Medicine   [facility]

## 2019-08-19 LAB
ALBUMIN SERPL BCP-MCNC: 3.6 G/DL (ref 3.5–5.2)
ALP SERPL-CCNC: 53 U/L (ref 55–135)
ALT SERPL W/O P-5'-P-CCNC: 207 U/L (ref 10–44)
ANION GAP SERPL CALC-SCNC: 12 MMOL/L (ref 8–16)
AST SERPL-CCNC: 85 U/L (ref 10–40)
BASOPHILS # BLD AUTO: 0.02 K/UL (ref 0–0.2)
BASOPHILS NFR BLD: 0.5 % (ref 0–1.9)
BILIRUB SERPL-MCNC: 1.2 MG/DL (ref 0.1–1)
BUN SERPL-MCNC: 18 MG/DL (ref 6–20)
CALCIUM SERPL-MCNC: 9 MG/DL (ref 8.7–10.5)
CHLORIDE SERPL-SCNC: 99 MMOL/L (ref 95–110)
CO2 SERPL-SCNC: 30 MMOL/L (ref 23–29)
CREAT SERPL-MCNC: 1.5 MG/DL (ref 0.5–1.4)
DIFFERENTIAL METHOD: ABNORMAL
EOSINOPHIL # BLD AUTO: 0.1 K/UL (ref 0–0.5)
EOSINOPHIL NFR BLD: 1.5 % (ref 0–8)
ERYTHROCYTE [DISTWIDTH] IN BLOOD BY AUTOMATED COUNT: 12.6 % (ref 11.5–14.5)
EST. GFR  (AFRICAN AMERICAN): >60 ML/MIN/1.73 M^2
EST. GFR  (NON AFRICAN AMERICAN): 57.4 ML/MIN/1.73 M^2
GLUCOSE SERPL-MCNC: 83 MG/DL (ref 70–110)
HCT VFR BLD AUTO: 43.5 % (ref 40–54)
HGB BLD-MCNC: 14.1 G/DL (ref 14–18)
IMM GRANULOCYTES # BLD AUTO: 0.01 K/UL (ref 0–0.04)
IMM GRANULOCYTES NFR BLD AUTO: 0.3 % (ref 0–0.5)
LYMPHOCYTES # BLD AUTO: 1.6 K/UL (ref 1–4.8)
LYMPHOCYTES NFR BLD: 39.7 % (ref 18–48)
MAGNESIUM SERPL-MCNC: 1.8 MG/DL (ref 1.6–2.6)
MCH RBC QN AUTO: 27.5 PG (ref 27–31)
MCHC RBC AUTO-ENTMCNC: 32.4 G/DL (ref 32–36)
MCV RBC AUTO: 85 FL (ref 82–98)
MONOCYTES # BLD AUTO: 0.7 K/UL (ref 0.3–1)
MONOCYTES NFR BLD: 17.4 % (ref 4–15)
NEUTROPHILS # BLD AUTO: 1.6 K/UL (ref 1.8–7.7)
NEUTROPHILS NFR BLD: 40.6 % (ref 38–73)
NRBC BLD-RTO: 0 /100 WBC
PLATELET # BLD AUTO: 202 K/UL (ref 150–350)
PMV BLD AUTO: 10.2 FL (ref 9.2–12.9)
POTASSIUM SERPL-SCNC: 3.5 MMOL/L (ref 3.5–5.1)
PROT SERPL-MCNC: 6.8 G/DL (ref 6–8.4)
RBC # BLD AUTO: 5.13 M/UL (ref 4.6–6.2)
SODIUM SERPL-SCNC: 141 MMOL/L (ref 136–145)
WBC # BLD AUTO: 3.9 K/UL (ref 3.9–12.7)

## 2019-08-19 PROCEDURE — 83735 ASSAY OF MAGNESIUM: CPT

## 2019-08-19 PROCEDURE — 12000002 HC ACUTE/MED SURGE SEMI-PRIVATE ROOM

## 2019-08-19 PROCEDURE — 85025 COMPLETE CBC W/AUTO DIFF WBC: CPT

## 2019-08-19 PROCEDURE — 80053 COMPREHEN METABOLIC PANEL: CPT

## 2019-08-19 PROCEDURE — 36415 COLL VENOUS BLD VENIPUNCTURE: CPT

## 2019-08-19 NOTE — SUBJECTIVE & OBJECTIVE
Interval History:  Completely asymptomatic.  Multiple flatus and bowel movement episodes.  Hungry.  Still complaining of NG tube.    Medications:  Continuous Infusions:   sodium chloride 0.9% 100 mL/hr at 08/18/19 1436     Scheduled Meds:  PRN Meds:lidocaine HCl 2%, morphine, morphine, ondansetron, phenol, promethazine (PHENERGAN) IVPB, sodium chloride 0.9%     Review of patient's allergies indicates:  No Known Allergies  Objective:     Vital Signs (Most Recent):  Temp: 98.5 °F (36.9 °C) (08/19/19 0744)  Pulse: 74 (08/19/19 0744)  Resp: 19 (08/19/19 0744)  BP: 128/72 (08/19/19 0744)  SpO2: 95 % (08/19/19 0744) Vital Signs (24h Range):  Temp:  [98.5 °F (36.9 °C)-99.4 °F (37.4 °C)] 98.5 °F (36.9 °C)  Pulse:  [74-94] 74  Resp:  [17-19] 19  SpO2:  [93 %-96 %] 95 %  BP: (124-142)/(72-91) 128/72     Weight: 90.7 kg (200 lb)  Body mass index is 27.89 kg/m².    Intake/Output - Last 3 Shifts       08/17 0700 - 08/18 0659 08/18 0700 - 08/19 0659 08/19 0700 - 08/20 0659    I.V. (mL/kg)  1148.3 (12.7)     IV Piggyback 2250 150     Total Intake(mL/kg) 2250 (24.8) 1298.3 (14.3)     Urine (mL/kg/hr)  400 (0.2)     Drains   1000    Stool  0     Total Output  400 1000    Net +2250 +898.3 -1000           Stool Occurrence  1 x           Physical Exam   Constitutional: He is oriented to person, place, and time. He is cooperative. No distress.   Neurological: He is alert and oriented to person, place, and time.   Skin:   Incisions are clean, dry and intact   There is no evidence of infection, hematoma or seroma.        Significant Labs:  CBC:   Recent Labs   Lab 08/19/19  0423   WBC 3.90   RBC 5.13   HGB 14.1   HCT 43.5      MCV 85   MCH 27.5   MCHC 32.4     BMP:   Recent Labs   Lab 08/19/19 0423   GLU 83      K 3.5   CL 99   CO2 30*   BUN 18   CREATININE 1.5*   CALCIUM 9.0   MG 1.8     CMP:   Recent Labs   Lab 08/19/19 0423   GLU 83   CALCIUM 9.0   ALBUMIN 3.6   PROT 6.8      K 3.5   CO2 30*   CL 99   BUN 18    CREATININE 1.5*   ALKPHOS 53*   *   AST 85*   BILITOT 1.2*       Significant Diagnostics:  I have reviewed all pertinent imaging results/findings within the past 24 hours.

## 2019-08-19 NOTE — ASSESSMENT & PLAN NOTE
Both clinically and radiologically obstruction appears to have cleared.  DC NG.  Full liquid diet.  She will be ready for discharge in the morning

## 2019-08-19 NOTE — PROGRESS NOTES
[facility]  General Surgery  Progress Note    Subjective:     History of Present Illness:  Patient admitted to the hospitalist service last night with diagnosis small-bowel obstruction. Patient complained of nausea and vomiting for last several days.  Crampy abdominal pain. Patient started feeling better after he had a huge vomitus in the emergency room as well as got an NG tube placed. About an hour prior to this interview patient had a huge liquid bowel movement and passed a lot of gas. He is actually feeling pretty well right now except he is bothered by his nasogastric tube.    Post-Op Info:  * No surgery found *         Interval History:  Completely asymptomatic.  Multiple flatus and bowel movement episodes.  Hungry.  Still complaining of NG tube.    Medications:  Continuous Infusions:   sodium chloride 0.9% 100 mL/hr at 08/18/19 1436     Scheduled Meds:  PRN Meds:lidocaine HCl 2%, morphine, morphine, ondansetron, phenol, promethazine (PHENERGAN) IVPB, sodium chloride 0.9%     Review of patient's allergies indicates:  No Known Allergies  Objective:     Vital Signs (Most Recent):  Temp: 98.5 °F (36.9 °C) (08/19/19 0744)  Pulse: 74 (08/19/19 0744)  Resp: 19 (08/19/19 0744)  BP: 128/72 (08/19/19 0744)  SpO2: 95 % (08/19/19 0744) Vital Signs (24h Range):  Temp:  [98.5 °F (36.9 °C)-99.4 °F (37.4 °C)] 98.5 °F (36.9 °C)  Pulse:  [74-94] 74  Resp:  [17-19] 19  SpO2:  [93 %-96 %] 95 %  BP: (124-142)/(72-91) 128/72     Weight: 90.7 kg (200 lb)  Body mass index is 27.89 kg/m².    Intake/Output - Last 3 Shifts       08/17 0700 - 08/18 0659 08/18 0700 - 08/19 0659 08/19 0700 - 08/20 0659    I.V. (mL/kg)  1148.3 (12.7)     IV Piggyback 2250 150     Total Intake(mL/kg) 2250 (24.8) 1298.3 (14.3)     Urine (mL/kg/hr)  400 (0.2)     Drains   1000    Stool  0     Total Output  400 1000    Net +2250 +898.3 -1000           Stool Occurrence  1 x           Physical Exam   Constitutional: He is oriented to person,  place, and time. He is cooperative. No distress.   Neurological: He is alert and oriented to person, place, and time.   Skin:   Incisions are clean, dry and intact   There is no evidence of infection, hematoma or seroma.        Significant Labs:  CBC:   Recent Labs   Lab 08/19/19  0423   WBC 3.90   RBC 5.13   HGB 14.1   HCT 43.5      MCV 85   MCH 27.5   MCHC 32.4     BMP:   Recent Labs   Lab 08/19/19  0423   GLU 83      K 3.5   CL 99   CO2 30*   BUN 18   CREATININE 1.5*   CALCIUM 9.0   MG 1.8     CMP:   Recent Labs   Lab 08/19/19  0423   GLU 83   CALCIUM 9.0   ALBUMIN 3.6   PROT 6.8      K 3.5   CO2 30*   CL 99   BUN 18   CREATININE 1.5*   ALKPHOS 53*   *   AST 85*   BILITOT 1.2*       Significant Diagnostics:  I have reviewed all pertinent imaging results/findings within the past 24 hours.    Assessment/Plan:     * Small bowel obstruction  Both clinically and radiologically obstruction appears to have cleared.  KODAK NG.  Full liquid diet.  She will be ready for discharge in the morning        Richar Tompkins MD  General Surgery  [facility]

## 2019-08-20 VITALS
TEMPERATURE: 98 F | HEIGHT: 71 IN | WEIGHT: 200 LBS | SYSTOLIC BLOOD PRESSURE: 121 MMHG | RESPIRATION RATE: 18 BRPM | DIASTOLIC BLOOD PRESSURE: 69 MMHG | OXYGEN SATURATION: 98 % | BODY MASS INDEX: 28 KG/M2 | HEART RATE: 55 BPM

## 2019-08-20 PROBLEM — K56.609 SMALL BOWEL OBSTRUCTION: Status: RESOLVED | Noted: 2019-08-18 | Resolved: 2019-08-20

## 2019-08-20 PROBLEM — D50.9 MICROCYTIC ANEMIA: Status: ACTIVE | Noted: 2019-08-20

## 2019-08-20 PROBLEM — N17.9 AKI (ACUTE KIDNEY INJURY): Status: RESOLVED | Noted: 2019-08-18 | Resolved: 2019-08-20

## 2019-08-20 LAB
ALBUMIN SERPL BCP-MCNC: 3 G/DL (ref 3.5–5.2)
ALP SERPL-CCNC: 42 U/L (ref 55–135)
ALT SERPL W/O P-5'-P-CCNC: 108 U/L (ref 10–44)
ANION GAP SERPL CALC-SCNC: 4 MMOL/L (ref 8–16)
AST SERPL-CCNC: 34 U/L (ref 10–40)
BARBITURATES UR QL SCN: NEGATIVE NG/ML
BASOPHILS # BLD AUTO: 0.01 K/UL (ref 0–0.2)
BASOPHILS NFR BLD: 0.3 % (ref 0–1.9)
BILIRUB SERPL-MCNC: 0.8 MG/DL (ref 0.1–1)
BUN SERPL-MCNC: 10 MG/DL (ref 6–20)
CALCIUM SERPL-MCNC: 8.3 MG/DL (ref 8.7–10.5)
CHLORIDE SERPL-SCNC: 106 MMOL/L (ref 95–110)
CO2 SERPL-SCNC: 30 MMOL/L (ref 23–29)
CREAT SERPL-MCNC: 1.1 MG/DL (ref 0.5–1.4)
DIFFERENTIAL METHOD: ABNORMAL
EOSINOPHIL # BLD AUTO: 0.1 K/UL (ref 0–0.5)
EOSINOPHIL NFR BLD: 3.5 % (ref 0–8)
ERYTHROCYTE [DISTWIDTH] IN BLOOD BY AUTOMATED COUNT: 12.5 % (ref 11.5–14.5)
EST. GFR  (AFRICAN AMERICAN): >60 ML/MIN/1.73 M^2
EST. GFR  (NON AFRICAN AMERICAN): >60 ML/MIN/1.73 M^2
GLUCOSE SERPL-MCNC: 94 MG/DL (ref 70–110)
HCT VFR BLD AUTO: 35.9 % (ref 40–54)
HGB BLD-MCNC: 11.5 G/DL (ref 14–18)
IMM GRANULOCYTES # BLD AUTO: 0.01 K/UL (ref 0–0.04)
IMM GRANULOCYTES NFR BLD AUTO: 0.3 % (ref 0–0.5)
LABORATORY COMMENT REPORT: NORMAL
LYMPHOCYTES # BLD AUTO: 1.9 K/UL (ref 1–4.8)
LYMPHOCYTES NFR BLD: 50.7 % (ref 18–48)
MAGNESIUM SERPL-MCNC: 1.8 MG/DL (ref 1.6–2.6)
MCH RBC QN AUTO: 27.7 PG (ref 27–31)
MCHC RBC AUTO-ENTMCNC: 32 G/DL (ref 32–36)
MCV RBC AUTO: 87 FL (ref 82–98)
MONOCYTES # BLD AUTO: 0.4 K/UL (ref 0.3–1)
MONOCYTES NFR BLD: 11.8 % (ref 4–15)
NEUTROPHILS # BLD AUTO: 1.3 K/UL (ref 1.8–7.7)
NEUTROPHILS NFR BLD: 33.4 % (ref 38–73)
NRBC BLD-RTO: 0 /100 WBC
PLATELET # BLD AUTO: 163 K/UL (ref 150–350)
PMV BLD AUTO: 10.5 FL (ref 9.2–12.9)
POTASSIUM SERPL-SCNC: 3.5 MMOL/L (ref 3.5–5.1)
PROT SERPL-MCNC: 5.7 G/DL (ref 6–8.4)
RBC # BLD AUTO: 4.15 M/UL (ref 4.6–6.2)
SODIUM SERPL-SCNC: 140 MMOL/L (ref 136–145)
WBC # BLD AUTO: 3.73 K/UL (ref 3.9–12.7)

## 2019-08-20 PROCEDURE — 36415 COLL VENOUS BLD VENIPUNCTURE: CPT

## 2019-08-20 PROCEDURE — 83735 ASSAY OF MAGNESIUM: CPT

## 2019-08-20 PROCEDURE — 80053 COMPREHEN METABOLIC PANEL: CPT

## 2019-08-20 PROCEDURE — 85025 COMPLETE CBC W/AUTO DIFF WBC: CPT

## 2019-08-20 PROCEDURE — 63600175 PHARM REV CODE 636 W HCPCS: Performed by: INTERNAL MEDICINE

## 2019-08-20 RX ORDER — MAGNESIUM SULFATE HEPTAHYDRATE 40 MG/ML
2 INJECTION, SOLUTION INTRAVENOUS ONCE
Status: COMPLETED | OUTPATIENT
Start: 2019-08-20 | End: 2019-08-20

## 2019-08-20 RX ADMIN — MAGNESIUM SULFATE 2 G: 2 INJECTION INTRAVENOUS at 10:08

## 2019-08-20 NOTE — DISCHARGE SUMMARY
"[facility] Hospital Medicine Discharge Summary    Date of Admit: 8/17/2019  Date of Discharge: 8/20/2019    Discharge to: home  Condition: good    Discharge Diagnoses     No problems updated.     Patient Active Problem List   Diagnosis    Family history of prostate cancer in father    Mitral valve prolapse    Small bowel obstruction    DAREK (acute kidney injury)    Transaminitis        Brief History of Present Illness      Andrade Moreno is a 38 y.o. male who  has a past medical history of Asthma and Hyperlipidemia.  The patient presented to [facility] on 8/17/2019 with a primary complaint of Abdominal Pain  .       For the full HPI please refer to the History & Physical from this admission.    Hospital Course     Admitted with SBO and seen in conjunction with general surgery. Responded well to conservative mgmt and at the time of discharge is pain free and tolerating PO with no issues. Asymptomatic and eager to be d/c. Advised him to return to the ED with any new, worsening, or recurrent symptoms.     Needs outpatient workup for anemia for which I advised f/u with his PCP - he recently saw one but hasn't gotten back for f/u yet. Encouraged him to f/u ASAP and make an appt.       Physical exam     /69   Pulse (!) 55   Temp 98.3 °F (36.8 °C) (Oral)   Resp 18   Ht 5' 11" (1.803 m)   Wt 90.7 kg (200 lb)   SpO2 98%   BMI 27.89 kg/m²   General appearance: alert, appears stated age and cooperative  Lungs: clear to auscultation bilaterally  Heart: regular rate and rhythm, S1, S2 normal, no murmur, click, rub or gallop  Abdomen: soft, non-tender; bowel sounds normal; no masses,  no organomegaly  Extremities: extremities normal, atraumatic, no cyanosis or edema  Skin: Skin color, texture, turgor normal. No rashes or lesions      Discharge Medications        Medication List      You have not been prescribed any medications.         Instructions:  1. Take all medications as prescribed  2. Keep all follow-up " appointments  3. Return to the hospital or call your primary care physicians for any new, worsening, or recurrent symptoms.    Follow-Up:          Richar Bonilla MD  10:11 AM  08/20/2019

## 2019-08-20 NOTE — PROGRESS NOTES
[facility] Medicine  Progress Note    Patient Name: Andrade Moreno  MRN: [id number]  Patient Class: IP- Inpatient   Admission Date: 8/17/2019  Length of Stay: 2 days  Attending Physician: Harshal Carter MD  Primary Care Provider: Camille Grider MD  Late entry due to epic down time-this patient was seen and examined approximately 4:40 p.m. on 08/19/2019    Subjective:     Principal Problem:Small bowel obstruction    HPI:  The patient is a 38 year old physically active male with no significant medical history. He had no abdominal surgery in the past. He presented to the ED with progressive moderate to severe diffuse abdominal pain, associated with abdominal distention, nausea, and vomiting for 2 days. He states that he has not had BMs since yesterday morning. There are no aggravating/alleviating factors. CT abdomen and pelvis showed dilatation of the small bowel loops with possible narrowed zone of transition in the right lower quadrant, likely representing small bowel obstruction secondary to adhesion or stricture.    Overview/Hospital Course:  The patient was admitted for workup and treatment including NPO with bowel rest, NG tube decompression, and IV fluids.  The patient had associated dehydration and acute kidney injury.  The patient is passing flatus and some stool.  His pain and nausea have resolved without recurrence today.  NG tube will be discontinued today.  Initiate clear liquid diet and advanced as tolerated very slowly.  Possible discharge next 24 hr    Interval History:   Today the patient reports improvement of nausea and vomiting, mild intensity, improved with medications and removal of NG tube, currently resolved.  No fever, chills, chest pain, or shortness of breath.  No bleeding.  The patient is passing flatus as well as soft slightly formed stool.    Review of Systems  Objective:     Vital Signs (Most Recent):  Temp: 98.2 °F (36.8 °C) (08/20/19 0021)  Pulse: (!) 59  (08/20/19 0021)  Resp: 17 (08/20/19 0021)  BP: 116/70 (08/20/19 0021)  SpO2: 98 % (08/20/19 0021) Vital Signs (24h Range):  Temp:  [97.3 °F (36.3 °C)-99.4 °F (37.4 °C)] 98.2 °F (36.8 °C)  Pulse:  [57-79] 59  Resp:  [17-19] 17  SpO2:  [95 %-98 %] 98 %  BP: (116-132)/(70-84) 116/70     Weight: 90.7 kg (200 lb)  Body mass index is 27.89 kg/m².    Intake/Output Summary (Last 24 hours) at 8/20/2019 0120  Last data filed at 8/19/2019 1814  Gross per 24 hour   Intake 1560 ml   Output 1000 ml   Net 560 ml      Physical Exam   General:  Comfortable appearing, no distress, well-nourished and well-developed  ENT:  Moist mucous membranes  Pulmonary:  Comfortable work of breathing, lungs clear to auscultation bilaterally  Cardiovascular:  2+ radial pulses, regular rate and rhythm  GI:  Abdomen soft and nontender, nondistended, positive bowel sounds  Skin:  Dry and warm no jaundice    Significant Labs:  Creatinine 1.5, AST 85,     Significant Imaging:  KUB personally reviewed today:  Nonspecific nonobstructive bowel gas pattern, improvement in previously seen obstruction    Assessment/Plan:      Assessment:  Acute small bowel obstruction  Acute kidney injury due to dehydration transaminitis due to small-bowel obstruction  Hyponatremia due to dehydration/hypovolemia    Plan:  Continue care on Med surg  Appreciate General surgery  NG tube removed today.  Trial of clear liquid diet and advance as tolerated over next 24 hr.    Continue IV fluids.  Avoid nephrotoxins.  Repeat a.m. labs.  Monitor and replace electrolytes as needed.  Supportive care including pain control and antiemetics as needed  Low risk for VTE:  Use SCDs until patient is able to ambulate  Disposition:  Likely discharge tomorrow morning  This patient is moderate risk secondary to moderate acute illness; IV fluids with additives; prescription drug management    VTE Risk Mitigation (From admission, onward)        Ordered     IP VTE LOW RISK PATIENT  Once       08/18/19 0210     Place sequential compression device  Until discontinued      08/18/19 0210            Harshal Carter MD  Department of Hospital Medicine   [facility]

## 2019-08-20 NOTE — ASSESSMENT & PLAN NOTE
Advanced to regular diet.  Okay with me to discharge home this morning.  I have discussed follow-up and possible further workup with the patient in detail and he would like to come see me I will be happy to see him in the office in a few weeks.

## 2019-08-20 NOTE — SUBJECTIVE & OBJECTIVE
Interval History:  Remains asymptomatic.  Positive flatus and bowel movement.  Tolerating liquid diet.  This  Medications:  Continuous Infusions:   sodium chloride 0.9% 100 mL/hr at 08/18/19 1436     Scheduled Meds:  PRN Meds:lidocaine HCl 2%, morphine, morphine, ondansetron, phenol, promethazine (PHENERGAN) IVPB, sodium chloride 0.9%     Review of patient's allergies indicates:  No Known Allergies  Objective:     Vital Signs (Most Recent):  Temp: 97.9 °F (36.6 °C) (08/20/19 0758)  Pulse: (!) 56 (08/20/19 0758)  Resp: 18 (08/20/19 0758)  BP: 115/71 (08/20/19 0758)  SpO2: 99 % (08/20/19 0758) Vital Signs (24h Range):  Temp:  [97.3 °F (36.3 °C)-99 °F (37.2 °C)] 97.9 °F (36.6 °C)  Pulse:  [56-79] 56  Resp:  [16-19] 18  SpO2:  [95 %-99 %] 99 %  BP: (113-132)/(70-84) 115/71     Weight: 90.7 kg (200 lb)  Body mass index is 27.89 kg/m².    Intake/Output - Last 3 Shifts       08/18 0700 - 08/19 0659 08/19 0700 - 08/20 0659 08/20 0700 - 08/21 0659    P.O.  360     I.V. (mL/kg) 1148.3 (12.7) 1200 (13.2)     IV Piggyback 150      Total Intake(mL/kg) 1298.3 (14.3) 1560 (17.2)     Urine (mL/kg/hr) 400 (0.2) 0 (0)     Drains  1000     Stool 0      Total Output 400 1000     Net +898.3 +560            Urine Occurrence  4 x     Stool Occurrence 1 x 1 x           Physical Exam   Constitutional: He appears well-developed and well-nourished.  Non-toxic appearance. No distress.   HENT:   Head: Normocephalic and atraumatic. Head is without abrasion and without laceration.   Right Ear: External ear normal.   Left Ear: External ear normal.   Nose: Nose normal.   Mouth/Throat: Oropharynx is clear and moist.   Eyes: Pupils are equal, round, and reactive to light. EOM are normal.   Neck: Trachea normal. No tracheal deviation and normal range of motion present. No thyroid mass and no thyromegaly present.   Cardiovascular: Normal rate and regular rhythm.   Pulmonary/Chest: Effort normal. No accessory muscle usage. No tachypnea. No respiratory  distress.   Abdominal: Soft. Normal appearance and bowel sounds are normal. He exhibits no distension and no mass. There is no hepatosplenomegaly. There is no tenderness. There is no tenderness at McBurney's point and negative Davis's sign. No hernia.   Lymphadenopathy:     He has no cervical adenopathy.     He has no axillary adenopathy.        Right: No inguinal adenopathy present.        Left: No inguinal adenopathy present.   Neurological: He is alert. Coordination and gait normal.   Skin: Skin is warm and intact.   Psychiatric: He has a normal mood and affect. His speech is normal and behavior is normal.       Significant Labs:      Significant Diagnostics:

## 2019-08-20 NOTE — SUBJECTIVE & OBJECTIVE
Interval History:   Today the patient reports improvement of nausea and vomiting, mild intensity, improved with medications and removal of NG tube, currently resolved.  No fever, chills, chest pain, or shortness of breath.  No bleeding.  The patient is passing flatus as well as soft slightly formed stool.    Review of Systems  Objective:     Vital Signs (Most Recent):  Temp: 98.2 °F (36.8 °C) (08/20/19 0021)  Pulse: (!) 59 (08/20/19 0021)  Resp: 17 (08/20/19 0021)  BP: 116/70 (08/20/19 0021)  SpO2: 98 % (08/20/19 0021) Vital Signs (24h Range):  Temp:  [97.3 °F (36.3 °C)-99.4 °F (37.4 °C)] 98.2 °F (36.8 °C)  Pulse:  [57-79] 59  Resp:  [17-19] 17  SpO2:  [95 %-98 %] 98 %  BP: (116-132)/(70-84) 116/70     Weight: 90.7 kg (200 lb)  Body mass index is 27.89 kg/m².    Intake/Output Summary (Last 24 hours) at 8/20/2019 0120  Last data filed at 8/19/2019 1814  Gross per 24 hour   Intake 1560 ml   Output 1000 ml   Net 560 ml      Physical Exam   General:  Comfortable appearing, no distress, well-nourished and well-developed  ENT:  Moist mucous membranes  Head and eyes:  Anicteric sclerae, no conjunctival discharge, PERRLA  Pulmonary:  Comfortable work of breathing, lungs clear to auscultation bilaterally  Cardiovascular:  2+ radial pulses, regular rate and rhythm  GI:  Abdomen soft and nontender, nondistended, positive bowel sounds  Skin:  Dry and warm no jaundice  Psych:  Mood is normal, affect full, insight good  Neuro:  Nonfocal motor exam, ambulating in room without difficulty, fluent speech, alert and oriented    Significant Labs:    Significant Imaging:

## 2019-08-20 NOTE — HOSPITAL COURSE
The patient was admitted for workup and treatment including NPO with bowel rest, NG tube decompression, and IV fluids.  The patient had associated dehydration and acute kidney injury.  The patient is passing flatus and some stool.  His pain and nausea have resolved without recurrence today.  NG tube will be discontinued today.  Initiate clear liquid diet and advanced as tolerated very slowly.  Possible discharge next 24 hr

## 2019-08-20 NOTE — PROGRESS NOTES
[facility]  General Surgery  Progress Note    Subjective:     History of Present Illness:  Patient admitted to the hospitalist service last night with diagnosis small-bowel obstruction. Patient complained of nausea and vomiting for last several days.  Crampy abdominal pain. Patient started feeling better after he had a huge vomitus in the emergency room as well as got an NG tube placed. About an hour prior to this interview patient had a huge liquid bowel movement and passed a lot of gas. He is actually feeling pretty well right now except he is bothered by his nasogastric tube.    Post-Op Info:  * No surgery found *         Interval History:  Remains asymptomatic.  Positive flatus and bowel movement.  Tolerating liquid diet.  This  Medications:  Continuous Infusions:   sodium chloride 0.9% 100 mL/hr at 08/18/19 1436     Scheduled Meds:  PRN Meds:lidocaine HCl 2%, morphine, morphine, ondansetron, phenol, promethazine (PHENERGAN) IVPB, sodium chloride 0.9%     Review of patient's allergies indicates:  No Known Allergies  Objective:     Vital Signs (Most Recent):  Temp: 97.9 °F (36.6 °C) (08/20/19 0758)  Pulse: (!) 56 (08/20/19 0758)  Resp: 18 (08/20/19 0758)  BP: 115/71 (08/20/19 0758)  SpO2: 99 % (08/20/19 0758) Vital Signs (24h Range):  Temp:  [97.3 °F (36.3 °C)-99 °F (37.2 °C)] 97.9 °F (36.6 °C)  Pulse:  [56-79] 56  Resp:  [16-19] 18  SpO2:  [95 %-99 %] 99 %  BP: (113-132)/(70-84) 115/71     Weight: 90.7 kg (200 lb)  Body mass index is 27.89 kg/m².    Intake/Output - Last 3 Shifts       08/18 0700 - 08/19 0659 08/19 0700 - 08/20 0659 08/20 0700 - 08/21 0659    P.O.  360     I.V. (mL/kg) 1148.3 (12.7) 1200 (13.2)     IV Piggyback 150      Total Intake(mL/kg) 1298.3 (14.3) 1560 (17.2)     Urine (mL/kg/hr) 400 (0.2) 0 (0)     Drains  1000     Stool 0      Total Output 400 1000     Net +898.3 +560            Urine Occurrence  4 x     Stool Occurrence 1 x 1 x           Physical Exam   Constitutional: He  appears well-developed and well-nourished.  Non-toxic appearance. No distress.   HENT:   Head: Normocephalic and atraumatic. Head is without abrasion and without laceration.   Right Ear: External ear normal.   Left Ear: External ear normal.   Nose: Nose normal.   Mouth/Throat: Oropharynx is clear and moist.   Eyes: Pupils are equal, round, and reactive to light. EOM are normal.   Neck: Trachea normal. No tracheal deviation and normal range of motion present. No thyroid mass and no thyromegaly present.   Cardiovascular: Normal rate and regular rhythm.   Pulmonary/Chest: Effort normal. No accessory muscle usage. No tachypnea. No respiratory distress.   Abdominal: Soft. Normal appearance and bowel sounds are normal. He exhibits no distension and no mass. There is no hepatosplenomegaly. There is no tenderness. There is no tenderness at McBurney's point and negative Davis's sign. No hernia.   Lymphadenopathy:     He has no cervical adenopathy.     He has no axillary adenopathy.        Right: No inguinal adenopathy present.        Left: No inguinal adenopathy present.   Neurological: He is alert. Coordination and gait normal.   Skin: Skin is warm and intact.   Psychiatric: He has a normal mood and affect. His speech is normal and behavior is normal.       Significant Labs:      Significant Diagnostics:      Assessment/Plan:     * Small bowel obstruction  Advanced to regular diet.  Okay with me to discharge home this morning.  I have discussed follow-up and possible further workup with the patient in detail and he would like to come see me I will be happy to see him in the office in a few weeks.        Richar Tompkins MD  General Surgery  [facility]

## 2019-08-22 NOTE — ED PROVIDER NOTES
Encounter Date: 8/17/2019       History     Chief Complaint   Patient presents with    Abdominal Pain     38-year-old male with a past medical history of hyperlipidemia presents to the emergency department with abdominal pain.  The patient states that he has had moderate to severe abdominal pain since yesterday morning.  He endorses progressive abdominal distention. He has been nauseated with several episodes of nonbloody, nonbilious emesis.  His last bowel movement was yesterday morning prior to the onset of the abdominal pain and distention.  He is not passing flatus.  He has no prior abdominal surgeries.  He denies any fevers, chills or urinary        Review of patient's allergies indicates:  No Known Allergies  Past Medical History:   Diagnosis Date    Asthma     Hyperlipidemia      Past Surgical History:   Procedure Laterality Date    ANKLE SURGERY       Family History   Problem Relation Age of Onset    Hyperlipidemia Father      Social History     Tobacco Use    Smoking status: Never Smoker   Substance Use Topics    Alcohol use: Not Currently    Drug use: Never     Review of Systems   Constitutional: Negative for chills, diaphoresis, fatigue and fever.   HENT: Negative for congestion.    Eyes: Negative for visual disturbance.   Respiratory: Negative for cough, shortness of breath, wheezing and stridor.    Cardiovascular: Negative for chest pain.   Gastrointestinal: Positive for abdominal distention, abdominal pain, diarrhea, nausea and vomiting.   Genitourinary: Negative for decreased urine volume, dysuria, flank pain and hematuria.   Musculoskeletal: Negative for back pain.   Skin: Negative for pallor.   Neurological: Negative for weakness, light-headedness, numbness and headaches.   Psychiatric/Behavioral: Negative for confusion.   All other systems reviewed and are negative.      Physical Exam     Initial Vitals [08/17/19 1915]   BP Pulse Resp Temp SpO2   123/86 (!) 130 20 99.3 °F (37.4 °C) 100 %       MAP       --         Physical Exam    Nursing note and vitals reviewed.  Constitutional: He appears well-developed. He does not appear ill.   HENT:   Head: Normocephalic and atraumatic.   Mouth/Throat: Oropharynx is clear and moist.   Eyes: EOM are normal.   Cardiovascular: Normal rate and regular rhythm.   Pulmonary/Chest: Effort normal and breath sounds normal. He has no wheezes. He has no rhonchi. He has no rales.   Abdominal: He exhibits no mass. Bowel sounds are increased. There is no hepatosplenomegaly. There is tenderness (Diffuse, moderate tenderness). There is guarding ( diffuse guarding). There is no rigidity, no rebound, no CVA tenderness, no tenderness at McBurney's point and negative Davis's sign. No hernia.   Moderate distension   Genitourinary:   Genitourinary Comments: No CVA tenderness   Neurological: He is alert and oriented to person, place, and time.   Skin: Skin is warm and dry. Capillary refill takes less than 2 seconds.   Psychiatric: He has a normal mood and affect.         ED Course   Procedures  Labs Reviewed   CBC W/ AUTO DIFFERENTIAL - Abnormal; Notable for the following components:       Result Value    Lymph # 0.8 (*)     Lymph% 17.5 (*)     Mono% 16.3 (*)     All other components within normal limits   COMPREHENSIVE METABOLIC PANEL - Abnormal; Notable for the following components:    Sodium 134 (*)     Chloride 93 (*)     Glucose 135 (*)     BUN, Bld 21 (*)     Creatinine 2.1 (*)     Total Bilirubin 1.1 (*)     AST 60 (*)     ALT 68 (*)     eGFR if  44.2 (*)     eGFR if non  38.2 (*)     All other components within normal limits   URINALYSIS, REFLEX TO URINE CULTURE - Abnormal; Notable for the following components:    Appearance, UA Hazy (*)     Protein, UA 2+ (*)     Ketones, UA Trace (*)     Bilirubin (UA) 1+ (*)     Urobilinogen, UA 2.0-3.0 (*)     Leukocytes, UA Trace (*)     All other components within normal limits    Narrative:     Preferred  Collection Type->Urine, Clean Catch  Specimen Source->Urine   URINALYSIS MICROSCOPIC - Abnormal; Notable for the following components:    RBC, UA 11 (*)     WBC, UA 10 (*)     Hyaline Casts, UA 85 (*)     All other components within normal limits    Narrative:     Preferred Collection Type->Urine, Clean Catch  Specimen Source->Urine   DRUG SCREEN PANEL, URINE EMERGENCY - Abnormal; Notable for the following components:    Creatinine, Random Ur 840.0 (*)     All other components within normal limits   LIPASE   CK   DRUG SCREEN PANEL, URINE EMERGENCY   CK   ISTAT LACTATE        ECG Results          EKG 12-lead (Final result)  Result time 08/20/19 21:23:47    Final result by Interface, Lab In [facility] (08/20/19 21:23:47)                 Narrative:    Test Reason : R10.9,    Vent. Rate : 102 BPM     Atrial Rate : 102 BPM     P-R Int : 146 ms          QRS Dur : 080 ms      QT Int : 322 ms       P-R-T Axes : 065 044 015 degrees     QTc Int : 419 ms    Sinus tachycardia    No previous ECGs available  Confirmed by Sonu Cummins MD ([id number]) on 8/20/2019 9:23:38 PM    Referred By: [email or web address]   SELF           Confirmed By:Sonu Cummins MD                            Imaging Results          X-Ray Abdomen AP 1 View (KUB) (Final result)  Result time 08/18/19 07:22:20    Final result by Ryan Palomo MD (08/18/19 07:22:20)                 Impression:      Placement of nasogastric tube as above.      Electronically signed by: Ryan Palomo MD  Date:    08/18/2019  Time:    07:22             Narrative:    EXAMINATION:  XR ABDOMEN AP 1 VIEW    CLINICAL HISTORY:  Encounter for fitting and adjustment of other gastrointestinal appliance and device    COMPARISON:  08/17/2019    FINDINGS:  Nasogastric tube has been placed.  Tip of the tube projects over the expected location of the stomach, with side port located at the level of the GE junction.  Gaseous distension and dilation of small bowel                               CT Renal Stone  Study ABD Pelvis WO (Final result)  Result time 08/17/19 23:37:17    Final result by Kamla Lugo MD (08/17/19 23:37:17)                 Narrative:        Exam: CT OF THE ABDOMEN/PELVIS WITHOUT CONTRAST    Clinical data: Abdominal pain, distention.    Technique: Direct contiguous axial CT images were acquired through the abdomen and pelvis without contrast using soft tissue and bone algorithms. Reformatted/MPR images were performed. Radiation dose: CTDIvol = 11.0 mGy, DLP = 632.80 mGy x cm.      Limitations: Lack of intravenous contrast limits evaluation of solid viscera. Lack of oral contrast limits evaluation of the bowel loops.        Prior Studies: No prior studies submitted.      Findings: Lung bases:  Grossly clear.      Liver:   Unremarkable size, contour, and density. No evidence of mass. No evidence of dilated ducts.      Gallbladder :  Unremarkable      Spleen:  Grossly unremarkable.      Pancreas/adrenal glands:   Grossly unremarkable size, contour and density.      Kidneys:   In anatomic position. Grossly unremarkable renal size, contour and density. No renal or ureteral calculi. No hydronephrosis.  Perinephric space is unremarkable.      Retroperitoneum: No retroperitoneal lymphadenopathy. Unremarkable abdominal aorta.  The IVC is grossly unremarkable.      Peritoneal cavity:  No evidence of free air or ascites.      Gastrointestinal tract: Dilatation of the small bowel loops is identified with possible narrowed zone of transition in the right lower quadrant.  Likely representing small bowel obstruction secondary to adhesion or stricture.      Appendix:  Unremarkable.      Pelvis:  Solid and hollow viscera grossly unremarkable.  Bladder is moderately distended.      Osseous structures:  No acute or destructive bony process identified.  Degenerative disc disease at the L4-5 and L5-S1 levels.      IMPRESSION: Dilatation of the small bowel loops is identified with possible narrowed zone of  transition in the right lower quadrant.  Likely representing small bowel obstruction secondary to adhesion or stricture.      Recommendation: Follow up as clinically indicated.      All CT scans at this facility utilize dose modulation, iterative reconstruction, and/or weight based dosing when appropriate to reduce radiation dose to as low as reasonably achievable.        Read by:        Gianni Herrera MD  Transcribed by: Kelvin Park  Transcribed Date: 8/18/2019 0:10:24 AM  Electronically signed by: Gianni Herrera MD  Date signed: 8/18/2019 0:16:28 AM                               X-Ray Abdomen Flat And Erect (Final result)  Result time 08/18/19 06:41:23    Final result by Ryan Palomo MD (08/18/19 06:41:23)                 Impression:      Gaseous distension and dilation of small bowel consistent with small bowel obstruction.  Please see subsequently performed CT abdomen and pelvis for additional detail.      Electronically signed by: Ryan Palomo MD  Date:    08/18/2019  Time:    06:41             Narrative:    EXAMINATION:  XR ABDOMEN FLAT AND ERECT    CLINICAL HISTORY:  Unspecified abdominal pain    COMPARISON:  None    FINDINGS:  Lung bases are clear.  No free intraperitoneal air or pneumatosis.  Gaseous distension and dilation of small bowel loops (measuring up to 5 cm), compatible with mechanical small bowel obstruction.  Air-fluid levels noted on the erect view.  No radiopaque urinary calculi.  No acute osseous abnormality.                              X-Rays:   Independently Interpreted Readings:   Other Readings:  Abdominal x-ray with air-fluid levels    Medical Decision Making:   ED Management:  38-year-old male presents with abdominal distention, pain and vomiting. Differential includes small bowel obstruction, constipation, pancreatitis, biliary disease, hepatitis, nephrolithiasis, UTI, malignancy.  The patient's workup is remarkable for in a KI.  Normal white count.  Urinalysis without signs of  infection.  Imaging reveals mechanical small-bowel obstruction.  Despite antiemetics and pain control in the emergency department the patient continued to have emesis so NG tube was placed with improvement in symptoms. The patient will be admitted to the hospitalist service for surgical consultation.  Patient is aware and in agreement with plan of care.                      Clinical Impression:       ICD-10-CM ICD-9-CM   1. Small bowel obstruction K56.609 560.9   2. Abdominal pain R10.9 789.00   3. Encounter for nasogastric (NG) tube placement Z46.59 V53.59                                Jyoti Grimes MD  08/22/19 0432

## 2021-01-01 NOTE — PLAN OF CARE
Problem: Fluid Deficit (Intestinal Obstruction)  Goal: Fluid Balance  Outcome: Ongoing (interventions implemented as appropriate)  Intervention: Monitor and Manage Hypovolemia     08/20/19 1049   Monitor and Manage Cardiac Rhythm Effects   Fluid/Electrolyte Management fluids adjusted;intravenous fluid replacement initiated         Problem: Nausea and Vomiting (Intestinal Obstruction)  Goal: Nausea and Vomiting Relief  Outcome: Ongoing (interventions implemented as appropriate)  Intervention: Prevent and Manage Nausea and Vomiting     08/20/19 0903 08/20/19 1049   Optimize Eating and Swallowing   Aspiration Precautions  --  liquids/solids alternated   Prevent Deficiencies/Improve Nutritional Intake   Oral Care tongue brushed;teeth brushed  --          Problem: Pain (Intestinal Obstruction)  Goal: Acceptable Pain Control  Outcome: Ongoing (interventions implemented as appropriate)  Intervention: Monitor and Manage Pain     08/20/19 1049   Prevent or Manage Pain   Pain Management Interventions position adjusted            Statement Selected

## 2021-01-18 ENCOUNTER — LAB VISIT (OUTPATIENT)
Dept: LAB | Facility: OTHER | Age: 41
End: 2021-01-18
Payer: COMMERCIAL

## 2021-01-18 DIAGNOSIS — Z20.822 ENCOUNTER FOR LABORATORY TESTING FOR COVID-19 VIRUS: ICD-10-CM

## 2021-01-18 PROCEDURE — U0003 INFECTIOUS AGENT DETECTION BY NUCLEIC ACID (DNA OR RNA); SEVERE ACUTE RESPIRATORY SYNDROME CORONAVIRUS 2 (SARS-COV-2) (CORONAVIRUS DISEASE [COVID-19]), AMPLIFIED PROBE TECHNIQUE, MAKING USE OF HIGH THROUGHPUT TECHNOLOGIES AS DESCRIBED BY CMS-2020-01-R: HCPCS

## 2021-01-20 LAB — SARS-COV-2 RNA RESP QL NAA+PROBE: NOT DETECTED

## 2021-01-25 ENCOUNTER — LAB VISIT (OUTPATIENT)
Dept: LAB | Facility: OTHER | Age: 41
End: 2021-01-25
Payer: COMMERCIAL

## 2021-01-25 DIAGNOSIS — Z20.822 ENCOUNTER FOR LABORATORY TESTING FOR COVID-19 VIRUS: ICD-10-CM

## 2021-01-25 PROCEDURE — U0003 INFECTIOUS AGENT DETECTION BY NUCLEIC ACID (DNA OR RNA); SEVERE ACUTE RESPIRATORY SYNDROME CORONAVIRUS 2 (SARS-COV-2) (CORONAVIRUS DISEASE [COVID-19]), AMPLIFIED PROBE TECHNIQUE, MAKING USE OF HIGH THROUGHPUT TECHNOLOGIES AS DESCRIBED BY CMS-2020-01-R: HCPCS

## 2021-01-26 LAB — SARS-COV-2 RNA RESP QL NAA+PROBE: NOT DETECTED

## 2021-02-01 ENCOUNTER — LAB VISIT (OUTPATIENT)
Dept: LAB | Facility: OTHER | Age: 41
End: 2021-02-01
Payer: COMMERCIAL

## 2021-02-01 DIAGNOSIS — Z20.822 ENCOUNTER FOR LABORATORY TESTING FOR COVID-19 VIRUS: ICD-10-CM

## 2021-02-01 PROCEDURE — U0003 INFECTIOUS AGENT DETECTION BY NUCLEIC ACID (DNA OR RNA); SEVERE ACUTE RESPIRATORY SYNDROME CORONAVIRUS 2 (SARS-COV-2) (CORONAVIRUS DISEASE [COVID-19]), AMPLIFIED PROBE TECHNIQUE, MAKING USE OF HIGH THROUGHPUT TECHNOLOGIES AS DESCRIBED BY CMS-2020-01-R: HCPCS

## 2021-02-02 LAB — SARS-COV-2 RNA RESP QL NAA+PROBE: NOT DETECTED

## 2021-02-15 ENCOUNTER — LAB VISIT (OUTPATIENT)
Dept: LAB | Facility: OTHER | Age: 41
End: 2021-02-15
Payer: COMMERCIAL

## 2021-02-15 DIAGNOSIS — Z20.822 ENCOUNTER FOR LABORATORY TESTING FOR COVID-19 VIRUS: ICD-10-CM

## 2021-02-15 PROCEDURE — U0003 INFECTIOUS AGENT DETECTION BY NUCLEIC ACID (DNA OR RNA); SEVERE ACUTE RESPIRATORY SYNDROME CORONAVIRUS 2 (SARS-COV-2) (CORONAVIRUS DISEASE [COVID-19]), AMPLIFIED PROBE TECHNIQUE, MAKING USE OF HIGH THROUGHPUT TECHNOLOGIES AS DESCRIBED BY CMS-2020-01-R: HCPCS

## 2021-02-17 LAB — SARS-COV-2 RNA RESP QL NAA+PROBE: NOT DETECTED

## 2021-02-22 ENCOUNTER — LAB VISIT (OUTPATIENT)
Dept: LAB | Facility: OTHER | Age: 41
End: 2021-02-22
Payer: COMMERCIAL

## 2021-02-22 DIAGNOSIS — Z20.822 ENCOUNTER FOR LABORATORY TESTING FOR COVID-19 VIRUS: ICD-10-CM

## 2021-02-22 PROCEDURE — U0003 INFECTIOUS AGENT DETECTION BY NUCLEIC ACID (DNA OR RNA); SEVERE ACUTE RESPIRATORY SYNDROME CORONAVIRUS 2 (SARS-COV-2) (CORONAVIRUS DISEASE [COVID-19]), AMPLIFIED PROBE TECHNIQUE, MAKING USE OF HIGH THROUGHPUT TECHNOLOGIES AS DESCRIBED BY CMS-2020-01-R: HCPCS

## 2021-02-23 LAB — SARS-COV-2 RNA RESP QL NAA+PROBE: NOT DETECTED

## 2021-03-01 ENCOUNTER — LAB VISIT (OUTPATIENT)
Dept: LAB | Facility: OTHER | Age: 41
End: 2021-03-01
Payer: COMMERCIAL

## 2021-03-01 DIAGNOSIS — Z20.822 ENCOUNTER FOR LABORATORY TESTING FOR COVID-19 VIRUS: ICD-10-CM

## 2021-03-01 PROCEDURE — U0003 INFECTIOUS AGENT DETECTION BY NUCLEIC ACID (DNA OR RNA); SEVERE ACUTE RESPIRATORY SYNDROME CORONAVIRUS 2 (SARS-COV-2) (CORONAVIRUS DISEASE [COVID-19]), AMPLIFIED PROBE TECHNIQUE, MAKING USE OF HIGH THROUGHPUT TECHNOLOGIES AS DESCRIBED BY CMS-2020-01-R: HCPCS

## 2021-03-03 LAB — SARS-COV-2 RNA RESP QL NAA+PROBE: NOT DETECTED

## 2021-03-08 ENCOUNTER — LAB VISIT (OUTPATIENT)
Dept: LAB | Facility: OTHER | Age: 41
End: 2021-03-08
Payer: COMMERCIAL

## 2021-03-08 DIAGNOSIS — Z20.822 ENCOUNTER FOR LABORATORY TESTING FOR COVID-19 VIRUS: ICD-10-CM

## 2021-03-08 PROCEDURE — U0003 INFECTIOUS AGENT DETECTION BY NUCLEIC ACID (DNA OR RNA); SEVERE ACUTE RESPIRATORY SYNDROME CORONAVIRUS 2 (SARS-COV-2) (CORONAVIRUS DISEASE [COVID-19]), AMPLIFIED PROBE TECHNIQUE, MAKING USE OF HIGH THROUGHPUT TECHNOLOGIES AS DESCRIBED BY CMS-2020-01-R: HCPCS | Performed by: NURSE PRACTITIONER

## 2021-03-09 LAB — SARS-COV-2 RNA RESP QL NAA+PROBE: NOT DETECTED

## 2021-03-15 ENCOUNTER — LAB VISIT (OUTPATIENT)
Dept: LAB | Facility: OTHER | Age: 41
End: 2021-03-15
Payer: COMMERCIAL

## 2021-03-15 DIAGNOSIS — Z20.822 ENCOUNTER FOR LABORATORY TESTING FOR COVID-19 VIRUS: ICD-10-CM

## 2021-03-15 PROCEDURE — U0003 INFECTIOUS AGENT DETECTION BY NUCLEIC ACID (DNA OR RNA); SEVERE ACUTE RESPIRATORY SYNDROME CORONAVIRUS 2 (SARS-COV-2) (CORONAVIRUS DISEASE [COVID-19]), AMPLIFIED PROBE TECHNIQUE, MAKING USE OF HIGH THROUGHPUT TECHNOLOGIES AS DESCRIBED BY CMS-2020-01-R: HCPCS | Performed by: NURSE PRACTITIONER

## 2021-03-16 LAB — SARS-COV-2 RNA RESP QL NAA+PROBE: NOT DETECTED

## 2021-03-22 ENCOUNTER — LAB VISIT (OUTPATIENT)
Dept: LAB | Facility: OTHER | Age: 41
End: 2021-03-22
Payer: COMMERCIAL

## 2021-03-22 DIAGNOSIS — Z20.822 ENCOUNTER FOR LABORATORY TESTING FOR COVID-19 VIRUS: ICD-10-CM

## 2021-03-22 PROCEDURE — U0003 INFECTIOUS AGENT DETECTION BY NUCLEIC ACID (DNA OR RNA); SEVERE ACUTE RESPIRATORY SYNDROME CORONAVIRUS 2 (SARS-COV-2) (CORONAVIRUS DISEASE [COVID-19]), AMPLIFIED PROBE TECHNIQUE, MAKING USE OF HIGH THROUGHPUT TECHNOLOGIES AS DESCRIBED BY CMS-2020-01-R: HCPCS | Performed by: NURSE PRACTITIONER

## 2021-03-24 LAB — SARS-COV-2 RNA RESP QL NAA+PROBE: NOT DETECTED

## 2021-03-29 ENCOUNTER — LAB VISIT (OUTPATIENT)
Dept: LAB | Facility: OTHER | Age: 41
End: 2021-03-29
Payer: COMMERCIAL

## 2021-03-29 DIAGNOSIS — Z20.822 ENCOUNTER FOR LABORATORY TESTING FOR COVID-19 VIRUS: ICD-10-CM

## 2021-03-29 PROCEDURE — U0003 INFECTIOUS AGENT DETECTION BY NUCLEIC ACID (DNA OR RNA); SEVERE ACUTE RESPIRATORY SYNDROME CORONAVIRUS 2 (SARS-COV-2) (CORONAVIRUS DISEASE [COVID-19]), AMPLIFIED PROBE TECHNIQUE, MAKING USE OF HIGH THROUGHPUT TECHNOLOGIES AS DESCRIBED BY CMS-2020-01-R: HCPCS | Performed by: NURSE PRACTITIONER

## 2021-03-30 LAB — SARS-COV-2 RNA RESP QL NAA+PROBE: NOT DETECTED

## 2021-04-05 ENCOUNTER — LAB VISIT (OUTPATIENT)
Dept: LAB | Facility: OTHER | Age: 41
End: 2021-04-05
Payer: COMMERCIAL

## 2021-04-05 DIAGNOSIS — Z20.822 ENCOUNTER FOR LABORATORY TESTING FOR COVID-19 VIRUS: ICD-10-CM

## 2021-04-05 PROCEDURE — U0003 INFECTIOUS AGENT DETECTION BY NUCLEIC ACID (DNA OR RNA); SEVERE ACUTE RESPIRATORY SYNDROME CORONAVIRUS 2 (SARS-COV-2) (CORONAVIRUS DISEASE [COVID-19]), AMPLIFIED PROBE TECHNIQUE, MAKING USE OF HIGH THROUGHPUT TECHNOLOGIES AS DESCRIBED BY CMS-2020-01-R: HCPCS | Performed by: NURSE PRACTITIONER

## 2021-04-07 LAB — SARS-COV-2 RNA RESP QL NAA+PROBE: NOT DETECTED

## 2021-04-12 ENCOUNTER — LAB VISIT (OUTPATIENT)
Dept: LAB | Facility: OTHER | Age: 41
End: 2021-04-12
Payer: COMMERCIAL

## 2021-04-12 DIAGNOSIS — Z20.822 ENCOUNTER FOR LABORATORY TESTING FOR COVID-19 VIRUS: ICD-10-CM

## 2021-04-12 PROCEDURE — U0003 INFECTIOUS AGENT DETECTION BY NUCLEIC ACID (DNA OR RNA); SEVERE ACUTE RESPIRATORY SYNDROME CORONAVIRUS 2 (SARS-COV-2) (CORONAVIRUS DISEASE [COVID-19]), AMPLIFIED PROBE TECHNIQUE, MAKING USE OF HIGH THROUGHPUT TECHNOLOGIES AS DESCRIBED BY CMS-2020-01-R: HCPCS | Performed by: NURSE PRACTITIONER

## 2021-04-13 LAB — SARS-COV-2 RNA RESP QL NAA+PROBE: NOT DETECTED

## 2021-04-16 ENCOUNTER — PATIENT MESSAGE (OUTPATIENT)
Dept: RESEARCH | Facility: HOSPITAL | Age: 41
End: 2021-04-16

## 2021-04-19 ENCOUNTER — LAB VISIT (OUTPATIENT)
Dept: LAB | Facility: OTHER | Age: 41
End: 2021-04-19
Payer: COMMERCIAL

## 2021-04-19 DIAGNOSIS — Z20.822 ENCOUNTER FOR LABORATORY TESTING FOR COVID-19 VIRUS: ICD-10-CM

## 2021-04-19 PROCEDURE — U0003 INFECTIOUS AGENT DETECTION BY NUCLEIC ACID (DNA OR RNA); SEVERE ACUTE RESPIRATORY SYNDROME CORONAVIRUS 2 (SARS-COV-2) (CORONAVIRUS DISEASE [COVID-19]), AMPLIFIED PROBE TECHNIQUE, MAKING USE OF HIGH THROUGHPUT TECHNOLOGIES AS DESCRIBED BY CMS-2020-01-R: HCPCS | Performed by: NURSE PRACTITIONER

## 2021-04-21 LAB — SARS-COV-2 RNA RESP QL NAA+PROBE: NOT DETECTED

## 2021-05-03 ENCOUNTER — LAB VISIT (OUTPATIENT)
Dept: LAB | Facility: OTHER | Age: 41
End: 2021-05-03
Payer: COMMERCIAL

## 2021-05-03 DIAGNOSIS — Z20.822 ENCOUNTER FOR LABORATORY TESTING FOR COVID-19 VIRUS: ICD-10-CM

## 2021-05-03 PROCEDURE — U0003 INFECTIOUS AGENT DETECTION BY NUCLEIC ACID (DNA OR RNA); SEVERE ACUTE RESPIRATORY SYNDROME CORONAVIRUS 2 (SARS-COV-2) (CORONAVIRUS DISEASE [COVID-19]), AMPLIFIED PROBE TECHNIQUE, MAKING USE OF HIGH THROUGHPUT TECHNOLOGIES AS DESCRIBED BY CMS-2020-01-R: HCPCS | Performed by: NURSE PRACTITIONER

## 2021-05-04 DIAGNOSIS — U07.1 COVID-19 VIRUS DETECTED: ICD-10-CM

## 2021-05-04 LAB — SARS-COV-2 RNA RESP QL NAA+PROBE: DETECTED

## 2021-05-17 ENCOUNTER — LAB VISIT (OUTPATIENT)
Dept: LAB | Facility: OTHER | Age: 41
End: 2021-05-17
Payer: COMMERCIAL

## 2021-05-17 DIAGNOSIS — Z20.822 ENCOUNTER FOR LABORATORY TESTING FOR COVID-19 VIRUS: ICD-10-CM

## 2021-05-17 PROCEDURE — U0003 INFECTIOUS AGENT DETECTION BY NUCLEIC ACID (DNA OR RNA); SEVERE ACUTE RESPIRATORY SYNDROME CORONAVIRUS 2 (SARS-COV-2) (CORONAVIRUS DISEASE [COVID-19]), AMPLIFIED PROBE TECHNIQUE, MAKING USE OF HIGH THROUGHPUT TECHNOLOGIES AS DESCRIBED BY CMS-2020-01-R: HCPCS | Performed by: NURSE PRACTITIONER

## 2021-05-18 LAB — SARS-COV-2 RNA RESP QL NAA+PROBE: NOT DETECTED

## 2021-05-24 ENCOUNTER — LAB VISIT (OUTPATIENT)
Dept: LAB | Facility: OTHER | Age: 41
End: 2021-05-24
Payer: COMMERCIAL

## 2021-05-24 DIAGNOSIS — Z20.822 ENCOUNTER FOR LABORATORY TESTING FOR COVID-19 VIRUS: ICD-10-CM

## 2021-05-24 PROCEDURE — U0003 INFECTIOUS AGENT DETECTION BY NUCLEIC ACID (DNA OR RNA); SEVERE ACUTE RESPIRATORY SYNDROME CORONAVIRUS 2 (SARS-COV-2) (CORONAVIRUS DISEASE [COVID-19]), AMPLIFIED PROBE TECHNIQUE, MAKING USE OF HIGH THROUGHPUT TECHNOLOGIES AS DESCRIBED BY CMS-2020-01-R: HCPCS | Performed by: NURSE PRACTITIONER

## 2021-05-25 DIAGNOSIS — Z20.822 ENCOUNTER FOR LABORATORY TESTING FOR COVID-19 VIRUS: ICD-10-CM

## 2021-05-25 LAB — SARS-COV-2 RNA RESP QL NAA+PROBE: NOT DETECTED

## 2021-05-31 ENCOUNTER — LAB VISIT (OUTPATIENT)
Dept: LAB | Facility: OTHER | Age: 41
End: 2021-05-31
Payer: COMMERCIAL

## 2021-05-31 DIAGNOSIS — Z20.822 ENCOUNTER FOR LABORATORY TESTING FOR COVID-19 VIRUS: ICD-10-CM

## 2021-05-31 PROCEDURE — U0003 INFECTIOUS AGENT DETECTION BY NUCLEIC ACID (DNA OR RNA); SEVERE ACUTE RESPIRATORY SYNDROME CORONAVIRUS 2 (SARS-COV-2) (CORONAVIRUS DISEASE [COVID-19]), AMPLIFIED PROBE TECHNIQUE, MAKING USE OF HIGH THROUGHPUT TECHNOLOGIES AS DESCRIBED BY CMS-2020-01-R: HCPCS | Performed by: NURSE PRACTITIONER

## 2021-06-01 LAB — SARS-COV-2 RNA RESP QL NAA+PROBE: NOT DETECTED

## 2021-06-07 ENCOUNTER — LAB VISIT (OUTPATIENT)
Dept: LAB | Facility: OTHER | Age: 41
End: 2021-06-07
Payer: COMMERCIAL

## 2021-06-07 DIAGNOSIS — Z20.822 ENCOUNTER FOR LABORATORY TESTING FOR COVID-19 VIRUS: ICD-10-CM

## 2021-06-07 PROCEDURE — U0003 INFECTIOUS AGENT DETECTION BY NUCLEIC ACID (DNA OR RNA); SEVERE ACUTE RESPIRATORY SYNDROME CORONAVIRUS 2 (SARS-COV-2) (CORONAVIRUS DISEASE [COVID-19]), AMPLIFIED PROBE TECHNIQUE, MAKING USE OF HIGH THROUGHPUT TECHNOLOGIES AS DESCRIBED BY CMS-2020-01-R: HCPCS | Performed by: NURSE PRACTITIONER

## 2021-06-08 LAB — SARS-COV-2 RNA RESP QL NAA+PROBE: NOT DETECTED

## 2021-08-09 ENCOUNTER — LAB VISIT (OUTPATIENT)
Dept: LAB | Facility: OTHER | Age: 41
End: 2021-08-09
Payer: COMMERCIAL

## 2021-08-09 DIAGNOSIS — Z20.822 ENCOUNTER FOR LABORATORY TESTING FOR COVID-19 VIRUS: ICD-10-CM

## 2021-08-09 PROCEDURE — U0003 INFECTIOUS AGENT DETECTION BY NUCLEIC ACID (DNA OR RNA); SEVERE ACUTE RESPIRATORY SYNDROME CORONAVIRUS 2 (SARS-COV-2) (CORONAVIRUS DISEASE [COVID-19]), AMPLIFIED PROBE TECHNIQUE, MAKING USE OF HIGH THROUGHPUT TECHNOLOGIES AS DESCRIBED BY CMS-2020-01-R: HCPCS | Performed by: NURSE PRACTITIONER

## 2021-08-11 LAB
SARS-COV-2 RNA RESP QL NAA+PROBE: NORMAL
TEST PERFORMANCE INFO SPEC: NORMAL

## 2021-08-16 ENCOUNTER — LAB VISIT (OUTPATIENT)
Dept: LAB | Facility: OTHER | Age: 41
End: 2021-08-16
Payer: COMMERCIAL

## 2021-08-16 DIAGNOSIS — Z20.822 ENCOUNTER FOR LABORATORY TESTING FOR COVID-19 VIRUS: ICD-10-CM

## 2021-08-16 PROCEDURE — U0003 INFECTIOUS AGENT DETECTION BY NUCLEIC ACID (DNA OR RNA); SEVERE ACUTE RESPIRATORY SYNDROME CORONAVIRUS 2 (SARS-COV-2) (CORONAVIRUS DISEASE [COVID-19]), AMPLIFIED PROBE TECHNIQUE, MAKING USE OF HIGH THROUGHPUT TECHNOLOGIES AS DESCRIBED BY CMS-2020-01-R: HCPCS | Performed by: NURSE PRACTITIONER

## 2021-08-17 LAB
SARS-COV-2 RNA RESP QL NAA+PROBE: NOT DETECTED
SARS-COV-2- CYCLE NUMBER: -1

## 2021-08-23 ENCOUNTER — LAB VISIT (OUTPATIENT)
Dept: LAB | Facility: OTHER | Age: 41
End: 2021-08-23
Payer: COMMERCIAL

## 2021-08-23 DIAGNOSIS — Z20.822 ENCOUNTER FOR LABORATORY TESTING FOR COVID-19 VIRUS: ICD-10-CM

## 2021-08-23 PROCEDURE — U0003 INFECTIOUS AGENT DETECTION BY NUCLEIC ACID (DNA OR RNA); SEVERE ACUTE RESPIRATORY SYNDROME CORONAVIRUS 2 (SARS-COV-2) (CORONAVIRUS DISEASE [COVID-19]), AMPLIFIED PROBE TECHNIQUE, MAKING USE OF HIGH THROUGHPUT TECHNOLOGIES AS DESCRIBED BY CMS-2020-01-R: HCPCS | Performed by: NURSE PRACTITIONER

## 2021-08-26 LAB — SARS-COV-2 RNA RESP QL NAA+PROBE: NOT DETECTED

## 2021-12-09 NOTE — PROGRESS NOTES
Dr Dwayne Oliveira reviewed and signed lab results. Patient has abimbola't on 2/9/2022. Ng tube removed per order, pt tolerated procedure well. Small nose bleed noted after NG tube removal.

## 2024-08-18 ENCOUNTER — HOSPITAL ENCOUNTER (EMERGENCY)
Facility: HOSPITAL | Age: 44
Discharge: HOME | End: 2024-08-18
Attending: EMERGENCY MEDICINE
Payer: COMMERCIAL

## 2024-08-18 VITALS
DIASTOLIC BLOOD PRESSURE: 65 MMHG | TEMPERATURE: 99 F | BODY MASS INDEX: 29.4 KG/M2 | RESPIRATION RATE: 16 BRPM | HEIGHT: 71 IN | WEIGHT: 210 LBS | HEART RATE: 62 BPM | OXYGEN SATURATION: 97 % | SYSTOLIC BLOOD PRESSURE: 136 MMHG

## 2024-08-18 DIAGNOSIS — R50.9 FEBRILE ILLNESS: ICD-10-CM

## 2024-08-18 DIAGNOSIS — R50.9 FEVER: ICD-10-CM

## 2024-08-18 DIAGNOSIS — B34.9 VIRAL SYNDROME: Primary | ICD-10-CM

## 2024-08-18 LAB
ADENOVIRUS: NOT DETECTED
ALBUMIN SERPL BCP-MCNC: 4.3 G/DL (ref 3.5–5.2)
ALP SERPL-CCNC: 69 U/L (ref 55–135)
ALT SERPL W/O P-5'-P-CCNC: 12 U/L (ref 10–44)
ANION GAP SERPL CALC-SCNC: 9 MMOL/L (ref 8–16)
AST SERPL-CCNC: 17 U/L (ref 10–40)
BASOPHILS # BLD AUTO: 0.02 K/UL (ref 0–0.2)
BASOPHILS NFR BLD: 0.4 % (ref 0–1.9)
BILIRUB SERPL-MCNC: 0.5 MG/DL (ref 0.1–1)
BORDETELLA PARAPERTUSSIS (IS1001): NOT DETECTED
BORDETELLA PERTUSSIS (PTXP): NOT DETECTED
BUN SERPL-MCNC: 13 MG/DL (ref 6–20)
CALCIUM SERPL-MCNC: 9 MG/DL (ref 8.7–10.5)
CHLAMYDIA PNEUMONIAE: NOT DETECTED
CHLORIDE SERPL-SCNC: 101 MMOL/L (ref 95–110)
CK SERPL-CCNC: 75 U/L (ref 20–200)
CO2 SERPL-SCNC: 23 MMOL/L (ref 23–29)
CORONAVIRUS 229E, COMMON COLD VIRUS: NOT DETECTED
CORONAVIRUS HKU1, COMMON COLD VIRUS: NOT DETECTED
CORONAVIRUS NL63, COMMON COLD VIRUS: NOT DETECTED
CORONAVIRUS OC43, COMMON COLD VIRUS: NOT DETECTED
CREAT SERPL-MCNC: 1.1 MG/DL (ref 0.5–1.4)
DIFFERENTIAL METHOD BLD: ABNORMAL
EOSINOPHIL # BLD AUTO: 0 K/UL (ref 0–0.5)
EOSINOPHIL NFR BLD: 0.4 % (ref 0–8)
ERYTHROCYTE [DISTWIDTH] IN BLOOD BY AUTOMATED COUNT: 11.9 % (ref 11.5–14.5)
EST. GFR  (NO RACE VARIABLE): >60 ML/MIN/1.73 M^2
FLUBV RNA NPH QL NAA+NON-PROBE: NOT DETECTED
GLUCOSE SERPL-MCNC: 106 MG/DL (ref 70–110)
HCT VFR BLD AUTO: 38.6 % (ref 40–54)
HGB BLD-MCNC: 13 G/DL (ref 14–18)
HPIV1 RNA NPH QL NAA+NON-PROBE: NOT DETECTED
HPIV2 RNA NPH QL NAA+NON-PROBE: NOT DETECTED
HPIV3 RNA NPH QL NAA+NON-PROBE: NOT DETECTED
HPIV4 RNA NPH QL NAA+NON-PROBE: NOT DETECTED
HUMAN METAPNEUMOVIRUS: NOT DETECTED
IMM GRANULOCYTES # BLD AUTO: 0.01 K/UL (ref 0–0.04)
IMM GRANULOCYTES NFR BLD AUTO: 0.2 % (ref 0–0.5)
INFLUENZA A (SUBTYPES H1,H1-2009,H3): NOT DETECTED
INFLUENZA A, MOLECULAR: NEGATIVE
INFLUENZA B, MOLECULAR: NEGATIVE
LYMPHOCYTES # BLD AUTO: 1.1 K/UL (ref 1–4.8)
LYMPHOCYTES NFR BLD: 19.1 % (ref 18–48)
MCH RBC QN AUTO: 27.2 PG (ref 27–31)
MCHC RBC AUTO-ENTMCNC: 33.7 G/DL (ref 32–36)
MCV RBC AUTO: 81 FL (ref 82–98)
MONOCYTES # BLD AUTO: 0.4 K/UL (ref 0.3–1)
MONOCYTES NFR BLD: 7.9 % (ref 4–15)
MYCOPLASMA PNEUMONIAE: NOT DETECTED
NEUTROPHILS # BLD AUTO: 4 K/UL (ref 1.8–7.7)
NEUTROPHILS NFR BLD: 72 % (ref 38–73)
NRBC BLD-RTO: 0 /100 WBC
PLATELET # BLD AUTO: 245 K/UL (ref 150–450)
PMV BLD AUTO: 10.4 FL (ref 9.2–12.9)
POTASSIUM SERPL-SCNC: 3.9 MMOL/L (ref 3.5–5.1)
PROT SERPL-MCNC: 7.5 G/DL (ref 6–8.4)
RBC # BLD AUTO: 4.78 M/UL (ref 4.6–6.2)
RESPIRATORY INFECTION PANEL SOURCE: NORMAL
RSV RNA NPH QL NAA+NON-PROBE: NOT DETECTED
RV+EV RNA NPH QL NAA+NON-PROBE: NOT DETECTED
SARS-COV-2 RDRP RESP QL NAA+PROBE: NEGATIVE
SARS-COV-2 RNA RESP QL NAA+PROBE: NOT DETECTED
SODIUM SERPL-SCNC: 133 MMOL/L (ref 136–145)
SPECIMEN SOURCE: NORMAL
WBC # BLD AUTO: 5.6 K/UL (ref 3.9–12.7)

## 2024-08-18 PROCEDURE — 80053 COMPREHEN METABOLIC PANEL: CPT | Performed by: EMERGENCY MEDICINE

## 2024-08-18 PROCEDURE — 82550 ASSAY OF CK (CPK): CPT | Performed by: EMERGENCY MEDICINE

## 2024-08-18 PROCEDURE — 87798 DETECT AGENT NOS DNA AMP: CPT | Performed by: EMERGENCY MEDICINE

## 2024-08-18 PROCEDURE — 25000003 PHARM REV CODE 250: Performed by: EMERGENCY MEDICINE

## 2024-08-18 PROCEDURE — 87502 INFLUENZA DNA AMP PROBE: CPT | Mod: 59 | Performed by: EMERGENCY MEDICINE

## 2024-08-18 PROCEDURE — U0002 COVID-19 LAB TEST NON-CDC: HCPCS | Performed by: EMERGENCY MEDICINE

## 2024-08-18 PROCEDURE — 87581 M.PNEUMON DNA AMP PROBE: CPT | Performed by: EMERGENCY MEDICINE

## 2024-08-18 PROCEDURE — 63600175 PHARM REV CODE 636 W HCPCS: Performed by: EMERGENCY MEDICINE

## 2024-08-18 PROCEDURE — 85025 COMPLETE CBC W/AUTO DIFF WBC: CPT | Performed by: EMERGENCY MEDICINE

## 2024-08-18 PROCEDURE — 96361 HYDRATE IV INFUSION ADD-ON: CPT

## 2024-08-18 PROCEDURE — 96374 THER/PROPH/DIAG INJ IV PUSH: CPT

## 2024-08-18 PROCEDURE — 99284 EMERGENCY DEPT VISIT MOD MDM: CPT | Mod: 25

## 2024-08-18 RX ORDER — ACETAMINOPHEN 500 MG
1000 TABLET ORAL
Status: COMPLETED | OUTPATIENT
Start: 2024-08-18 | End: 2024-08-18

## 2024-08-18 RX ORDER — KETOROLAC TROMETHAMINE 30 MG/ML
15 INJECTION, SOLUTION INTRAMUSCULAR; INTRAVENOUS
Status: COMPLETED | OUTPATIENT
Start: 2024-08-18 | End: 2024-08-18

## 2024-08-18 RX ADMIN — KETOROLAC TROMETHAMINE 15 MG: 30 INJECTION, SOLUTION INTRAMUSCULAR; INTRAVENOUS at 07:08

## 2024-08-18 RX ADMIN — ACETAMINOPHEN 1000 MG: 500 TABLET, FILM COATED ORAL at 05:08

## 2024-08-18 RX ADMIN — SODIUM CHLORIDE, SODIUM LACTATE, POTASSIUM CHLORIDE, AND CALCIUM CHLORIDE 1000 ML: .6; .31; .03; .02 INJECTION, SOLUTION INTRAVENOUS at 07:08

## 2024-08-18 NOTE — ED PROVIDER NOTES
Encounter Date: 8/18/2024       History     Chief Complaint   Patient presents with    Nausea    Headache     Muscle tightness and cramping x 4-5 days.    Chills     This is a 43-year-old male with no significant past medical history, who presents emergency department with nausea, headache, muscle aches body aches and chills.  Patient says symptoms started on Wednesday.  He says that he is a  and has been outside in the heat.  He says that he feels like he has muscle aches body aches, and chills initially.  He thought he was dehydrated so he drank some fluids.  He says he developed a headache which does not seem to get any better with any Tylenol.  Says that today he started to experience some chills has a low-grade fever.  He denies any cough, he denies any sore throat.  He says that he just has not feeling well.      Review of patient's allergies indicates:  No Known Allergies  Past Medical History:   Diagnosis Date    Asthma     Hyperlipidemia      Past Surgical History:   Procedure Laterality Date    ANKLE SURGERY       Family History   Problem Relation Name Age of Onset    Hyperlipidemia Father       Social History     Tobacco Use    Smoking status: Never   Substance Use Topics    Alcohol use: Not Currently    Drug use: Never     Review of Systems   Constitutional:  Positive for activity change, chills and fatigue. Negative for fever.   HENT:  Negative for sore throat.    Respiratory:  Negative for shortness of breath.    Cardiovascular:  Negative for chest pain and palpitations.   Gastrointestinal:  Negative for abdominal pain, diarrhea, nausea and vomiting.   Genitourinary:  Negative for dysuria and flank pain.   Musculoskeletal:  Positive for myalgias. Negative for back pain and neck pain.   Skin:  Negative for rash.   Neurological:  Positive for headaches. Negative for weakness.   Hematological:  Does not bruise/bleed easily.   All other systems reviewed and are negative.      Physical Exam      Initial Vitals [08/18/24 1732]   BP Pulse Resp Temp SpO2   (!) 163/101 86 16 (!) 100.5 °F (38.1 °C) 98 %      MAP       --         Physical Exam    Nursing note and vitals reviewed.  Constitutional: He appears well-developed and well-nourished. He is not diaphoretic. No distress.   HENT:   Head: Normocephalic and atraumatic.   Mouth/Throat: Oropharynx is clear and moist. No oropharyngeal exudate.   Eyes: Conjunctivae and EOM are normal. Pupils are equal, round, and reactive to light.   Neck: Neck supple. No tracheal deviation present.   No meningismus, negative Kernig's and Brudzinski   Normal range of motion.  Cardiovascular:  Normal rate, regular rhythm, normal heart sounds and intact distal pulses.           No murmur heard.  Pulmonary/Chest: Breath sounds normal. No respiratory distress. He has no wheezes. He has no rales.   Abdominal: Abdomen is soft. Bowel sounds are normal. He exhibits no distension. There is no abdominal tenderness. There is no rebound.   Musculoskeletal:         General: No tenderness or edema. Normal range of motion.      Cervical back: Normal range of motion and neck supple.     Neurological: He is alert and oriented to person, place, and time. He has normal strength. No cranial nerve deficit or sensory deficit.   Skin: Skin is warm and dry. Capillary refill takes less than 2 seconds. No erythema. No pallor.   Psychiatric: He has a normal mood and affect. Thought content normal.         ED Course   Procedures  Labs Reviewed   CBC W/ AUTO DIFFERENTIAL - Abnormal       Result Value    WBC 5.60      RBC 4.78      Hemoglobin 13.0 (*)     Hematocrit 38.6 (*)     MCV 81 (*)     MCH 27.2      MCHC 33.7      RDW 11.9      Platelets 245      MPV 10.4      Immature Granulocytes 0.2      Gran # (ANC) 4.0      Immature Grans (Abs) 0.01      Lymph # 1.1      Mono # 0.4      Eos # 0.0      Baso # 0.02      nRBC 0      Gran % 72.0      Lymph % 19.1      Mono % 7.9      Eosinophil % 0.4      Basophil  % 0.4      Differential Method Automated     COMPREHENSIVE METABOLIC PANEL - Abnormal    Sodium 133 (*)     Potassium 3.9      Chloride 101      CO2 23      Glucose 106      BUN 13      Creatinine 1.1      Calcium 9.0      Total Protein 7.5      Albumin 4.3      Total Bilirubin 0.5      Alkaline Phosphatase 69      AST 17      ALT 12      eGFR >60.0      Anion Gap 9     RESPIRATORY INFECTION PANEL (PCR), NASOPHARYNGEAL    Respiratory Infection Panel Source NP swab      Adenovirus Not Detected      Coronavirus 229E, Common Cold Virus Not Detected      Coronavirus HKU1, Common Cold Virus Not Detected      Coronavirus NL63, Common Cold Virus Not Detected      Coronavirus OC43, Common Cold Virus Not Detected      SARS-CoV2 (COVID-19) Qualitative PCR Not Detected      Human Metapneumovirus Not Detected      Human Rhinovirus/Enterovirus Not Detected      Influenza A (subtypes H1, H1-2009,H3) Not Detected      Influenza B Not Detected      Parainfluenza Virus 1 Not Detected      Parainfluenza Virus 2 Not Detected      Parainfluenza Virus 3 Not Detected      Parainfluenza Virus 4 Not Detected      Respiratory Syncytial Virus Not Detected      Bordetella Parapertussis (KN0623) Not Detected      Bordetella pertussis (ptxP) Not Detected      Chlamydia pneumoniae Not Detected      Mycoplasma pneumoniae Not Detected      Narrative:     Respiratory Infection Panel source->NP Swab   SARS-COV-2 RNA AMPLIFICATION, QUAL    SARS-CoV-2 RNA, Amplification, Qual Negative     INFLUENZA A AND B ANTIGEN    Influenza A, Molecular Negative      Influenza B, Molecular Negative      Flu A & B Source Nasal swab      Narrative:     Specimen Source->Nasopharyngeal Swab   CK    CPK 75            Imaging Results              X-Ray Chest PA And Lateral (Final result)  Result time 08/18/24 19:28:41      Final result by Donald Stearns MD (08/18/24 19:28:41)                   Impression:      No acute findings      Electronically signed  by: Donald Stearns  Date:    08/18/2024  Time:    19:28               Narrative:    EXAMINATION:  XR CHEST PA AND LATERAL    CLINICAL HISTORY:  Fever, unspecified    TECHNIQUE:  PA and lateral views of the chest were performed.    COMPARISON:  None    FINDINGS:  Lungs are clear. No focal consolidation. No pleural effusion. No pneumothorax. Normal heart size.                                       Medications   acetaminophen tablet 1,000 mg (1,000 mg Oral Given 8/18/24 1744)   lactated ringers bolus 1,000 mL (0 mLs Intravenous Stopped 8/18/24 2045)   ketorolac injection 15 mg (15 mg Intravenous Given by Other 8/18/24 1927)     Medical Decision Making  Differential includes but not limited to viral illness, COVID, flu, dehydration, rhabdomyolysis, electrolyte abnormality, pneumonia, less suspicious for meningitis, encephalitis,    Emergent evaluation of a 43-year-old male presents emergency department with the above-mentioned complaints.  Patient emergency department with what appears to be a viral syndrome.  Patient however has tested negative for viruses on the respiratory viral panel.  Patient felt much better after receiving IV fluids Tylenol and Toradol.  He says his headache is almost gone.  His son is here in the ER with same complaints.  His other child is also starting to feel ill as well.  Likely a viral illness that we do not have a viral test for.  I considered meningitis/encephalitis but patient has good range of motion of his neck no neck stiffness no meningeal signs.  I do not feel that he has a acute bacterial meningitis on today's emergency department presentation.  Patient states that if his symptoms change or worsen if he gets new symptoms he should return to the emergency department.  Patient is comfortable with the above-mentioned assessment and plan and he will follow up with primary care provider on an outpatient basis.    A dictation software program was used for this note.  Please expect  some simple typographical  errors in this note.    I had a detailed discussion with the patient and/or guardian regarding: The historical points, exam findings, and diagnostic results supporting the discharge diagnosis, lab results, pertinent radiology results, and the need for outpatient follow-up, for definitive care with a family practitioner and to return to the emergency department if symptoms worsen or persist or if there are any questions or concerns that arise at home. All questions have been answered in detail. Strict return to Emergency Department precautions have been provided       Amount and/or Complexity of Data Reviewed  External Data Reviewed: labs and notes.  Labs: ordered. Decision-making details documented in ED Course.  Radiology: ordered and independent interpretation performed. Decision-making details documented in ED Course.  ECG/medicine tests: ordered and independent interpretation performed. Decision-making details documented in ED Course.    Risk  OTC drugs.  Prescription drug management.  Decision regarding hospitalization.               ED Course as of 08/19/24 0122   Sun Aug 18, 2024   6913 I reassessed the patient, headache is gone.  He is feeling better.  Plan to discharge home with outpatient follow up.  He will return if his symptoms change or worsen. [JR]      ED Course User Index  [JR] Stefan Rowley DO                           Clinical Impression:  Final diagnoses:  [R50.9] Fever  [B34.9] Viral syndrome (Primary)  [R50.9] Febrile illness          ED Disposition Condition    Discharge Stable          ED Prescriptions    None       Follow-up Information       Follow up With Specialties Details Why Contact Info Additional Information    [facility] - Emergency Dept Emergency Medicine  If symptoms worsen [address]  [facility] [address]  [phone] 1st floor    Your primary care provider  In 3 days                Stefan Rowley DO  08/19/24 0122

## 2024-08-19 NOTE — DISCHARGE INSTRUCTIONS
RETURN TO EMERGENCY DEPARTMENT WITHOUT FAIL, IF YOUR SYMPTOMS WORSEN, IF YOU GET NEW OR DIFFERENT SYMPTOMS, IF YOU ARE UNABLE TO FOLLOW UP AS DIRECTED, OR IF YOU HAVE ANY CONCERNS OR WORRIES.    Alternate Tylenol and ibuprofen for fever.  Return if symptoms change or worsen.